# Patient Record
Sex: MALE | Race: WHITE | NOT HISPANIC OR LATINO | Employment: OTHER | URBAN - METROPOLITAN AREA
[De-identification: names, ages, dates, MRNs, and addresses within clinical notes are randomized per-mention and may not be internally consistent; named-entity substitution may affect disease eponyms.]

---

## 2020-04-05 ENCOUNTER — HOSPITAL ENCOUNTER (INPATIENT)
Facility: HOSPITAL | Age: 65
LOS: 4 days | Discharge: HOME HEALTH | DRG: 460 | End: 2020-04-09
Attending: INTERNAL MEDICINE | Admitting: FAMILY MEDICINE
Payer: COMMERCIAL
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
## 2020-04-05 DIAGNOSIS — S32.001A CLOSED BURST FRACTURE OF LUMBAR VERTEBRA, INITIAL ENCOUNTER: Primary | ICD-10-CM

## 2020-04-05 PROBLEM — N17.9 AKI (ACUTE KIDNEY INJURY): Status: ACTIVE | Noted: 2020-04-05

## 2020-04-05 PROBLEM — E78.5 HYPERLIPIDEMIA: Status: ACTIVE | Noted: 2020-04-05

## 2020-04-05 PROBLEM — I10 HYPERTENSION: Status: ACTIVE | Noted: 2020-04-05

## 2020-04-05 PROBLEM — K21.9 GERD (GASTROESOPHAGEAL REFLUX DISEASE): Status: ACTIVE | Noted: 2020-04-05

## 2020-04-05 LAB
ABO + RH BLD: NORMAL
ALBUMIN SERPL BCP-MCNC: 3.5 G/DL (ref 3.5–5.2)
ALP SERPL-CCNC: 75 U/L (ref 55–135)
ALT SERPL W/O P-5'-P-CCNC: 35 U/L (ref 10–44)
ANION GAP SERPL CALC-SCNC: 10 MMOL/L (ref 8–16)
APTT BLDCRRT: NORMAL SEC (ref 21–32)
AST SERPL-CCNC: 29 U/L (ref 10–40)
BASOPHILS # BLD AUTO: 0.01 K/UL (ref 0–0.2)
BASOPHILS NFR BLD: 0.1 % (ref 0–1.9)
BILIRUB SERPL-MCNC: 0.4 MG/DL (ref 0.1–1)
BLD GP AB SCN CELLS X3 SERPL QL: NORMAL
BUN SERPL-MCNC: 20 MG/DL (ref 8–23)
CALCIUM SERPL-MCNC: 8.6 MG/DL (ref 8.7–10.5)
CHLORIDE SERPL-SCNC: 113 MMOL/L (ref 95–110)
CO2 SERPL-SCNC: 22 MMOL/L (ref 23–29)
CREAT SERPL-MCNC: 1.6 MG/DL (ref 0.5–1.4)
DIFFERENTIAL METHOD: ABNORMAL
EOSINOPHIL # BLD AUTO: 0 K/UL (ref 0–0.5)
EOSINOPHIL NFR BLD: 0.4 % (ref 0–8)
ERYTHROCYTE [DISTWIDTH] IN BLOOD BY AUTOMATED COUNT: 13 % (ref 11.5–14.5)
EST. GFR  (AFRICAN AMERICAN): 52 ML/MIN/1.73 M^2
EST. GFR  (NON AFRICAN AMERICAN): 45 ML/MIN/1.73 M^2
GLUCOSE SERPL-MCNC: 107 MG/DL (ref 70–110)
HCT VFR BLD AUTO: 42 % (ref 40–54)
HGB BLD-MCNC: 13.5 G/DL (ref 14–18)
IMM GRANULOCYTES # BLD AUTO: 0.08 K/UL (ref 0–0.04)
IMM GRANULOCYTES NFR BLD AUTO: 0.8 % (ref 0–0.5)
INR PPP: 1 (ref 0.8–1.2)
LYMPHOCYTES # BLD AUTO: 1.1 K/UL (ref 1–4.8)
LYMPHOCYTES NFR BLD: 11.6 % (ref 18–48)
MCH RBC QN AUTO: 32.3 PG (ref 27–31)
MCHC RBC AUTO-ENTMCNC: 32.1 G/DL (ref 32–36)
MCV RBC AUTO: 101 FL (ref 82–98)
MONOCYTES # BLD AUTO: 0.6 K/UL (ref 0.3–1)
MONOCYTES NFR BLD: 6 % (ref 4–15)
NEUTROPHILS # BLD AUTO: 7.8 K/UL (ref 1.8–7.7)
NEUTROPHILS NFR BLD: 81.1 % (ref 38–73)
NRBC BLD-RTO: 0 /100 WBC
PLATELET # BLD AUTO: 186 K/UL (ref 150–350)
PMV BLD AUTO: 10.9 FL (ref 9.2–12.9)
POTASSIUM SERPL-SCNC: 4.2 MMOL/L (ref 3.5–5.1)
PROT SERPL-MCNC: 6.5 G/DL (ref 6–8.4)
PROTHROMBIN TIME: 11.1 SEC (ref 9–12.5)
RBC # BLD AUTO: 4.18 M/UL (ref 4.6–6.2)
SODIUM SERPL-SCNC: 145 MMOL/L (ref 136–145)
TROPONIN I SERPL DL<=0.01 NG/ML-MCNC: <0.006 NG/ML (ref 0–0.03)
WBC # BLD AUTO: 9.67 K/UL (ref 3.9–12.7)

## 2020-04-05 PROCEDURE — 85025 COMPLETE CBC W/AUTO DIFF WBC: CPT

## 2020-04-05 PROCEDURE — 84484 ASSAY OF TROPONIN QUANT: CPT

## 2020-04-05 PROCEDURE — 63600175 PHARM REV CODE 636 W HCPCS: Performed by: NEUROLOGICAL SURGERY

## 2020-04-05 PROCEDURE — 25000003 PHARM REV CODE 250: Performed by: INTERNAL MEDICINE

## 2020-04-05 PROCEDURE — 80053 COMPREHEN METABOLIC PANEL: CPT

## 2020-04-05 PROCEDURE — 93005 ELECTROCARDIOGRAM TRACING: CPT

## 2020-04-05 PROCEDURE — 93010 ELECTROCARDIOGRAM REPORT: CPT | Mod: ,,, | Performed by: INTERNAL MEDICINE

## 2020-04-05 PROCEDURE — 85730 THROMBOPLASTIN TIME PARTIAL: CPT

## 2020-04-05 PROCEDURE — 96374 THER/PROPH/DIAG INJ IV PUSH: CPT

## 2020-04-05 PROCEDURE — 93010 EKG 12-LEAD: ICD-10-PCS | Mod: ,,, | Performed by: INTERNAL MEDICINE

## 2020-04-05 PROCEDURE — 25000003 PHARM REV CODE 250: Performed by: NEUROLOGICAL SURGERY

## 2020-04-05 PROCEDURE — 63600175 PHARM REV CODE 636 W HCPCS: Performed by: INTERNAL MEDICINE

## 2020-04-05 PROCEDURE — 11000001 HC ACUTE MED/SURG PRIVATE ROOM

## 2020-04-05 PROCEDURE — 99291 CRITICAL CARE FIRST HOUR: CPT | Mod: 25

## 2020-04-05 PROCEDURE — 85610 PROTHROMBIN TIME: CPT

## 2020-04-05 PROCEDURE — 86901 BLOOD TYPING SEROLOGIC RH(D): CPT

## 2020-04-05 PROCEDURE — 25000003 PHARM REV CODE 250: Performed by: NURSE PRACTITIONER

## 2020-04-05 PROCEDURE — 36415 COLL VENOUS BLD VENIPUNCTURE: CPT

## 2020-04-05 PROCEDURE — 63600175 PHARM REV CODE 636 W HCPCS: Performed by: NURSE PRACTITIONER

## 2020-04-05 RX ORDER — PANTOPRAZOLE SODIUM 40 MG/1
40 TABLET, DELAYED RELEASE ORAL DAILY
COMMUNITY
Start: 2019-11-22

## 2020-04-05 RX ORDER — LISINOPRIL 20 MG/1
40 TABLET ORAL DAILY
Status: DISCONTINUED | OUTPATIENT
Start: 2020-04-06 | End: 2020-04-05

## 2020-04-05 RX ORDER — SODIUM CHLORIDE, SODIUM LACTATE, POTASSIUM CHLORIDE, CALCIUM CHLORIDE 600; 310; 30; 20 MG/100ML; MG/100ML; MG/100ML; MG/100ML
INJECTION, SOLUTION INTRAVENOUS CONTINUOUS
Status: DISCONTINUED | OUTPATIENT
Start: 2020-04-05 | End: 2020-04-05

## 2020-04-05 RX ORDER — METHOCARBAMOL 750 MG/1
750 TABLET, FILM COATED ORAL 3 TIMES DAILY
Status: DISCONTINUED | OUTPATIENT
Start: 2020-04-05 | End: 2020-04-09 | Stop reason: HOSPADM

## 2020-04-05 RX ORDER — AMLODIPINE BESYLATE 10 MG/1
10 TABLET ORAL DAILY
Status: DISCONTINUED | OUTPATIENT
Start: 2020-04-06 | End: 2020-04-09 | Stop reason: HOSPADM

## 2020-04-05 RX ORDER — ONDANSETRON 2 MG/ML
4 INJECTION INTRAMUSCULAR; INTRAVENOUS EVERY 8 HOURS PRN
Status: DISCONTINUED | OUTPATIENT
Start: 2020-04-05 | End: 2020-04-08

## 2020-04-05 RX ORDER — NEBIVOLOL 10 MG/1
10 TABLET ORAL DAILY
Status: DISCONTINUED | OUTPATIENT
Start: 2020-04-06 | End: 2020-04-09 | Stop reason: HOSPADM

## 2020-04-05 RX ORDER — LISINOPRIL 40 MG/1
40 TABLET ORAL DAILY
COMMUNITY
Start: 2019-11-22

## 2020-04-05 RX ORDER — HYDROMORPHONE HYDROCHLORIDE 1 MG/ML
1 INJECTION, SOLUTION INTRAMUSCULAR; INTRAVENOUS; SUBCUTANEOUS
Status: DISCONTINUED | OUTPATIENT
Start: 2020-04-05 | End: 2020-04-05

## 2020-04-05 RX ORDER — HYDROMORPHONE HYDROCHLORIDE 1 MG/ML
1 INJECTION, SOLUTION INTRAMUSCULAR; INTRAVENOUS; SUBCUTANEOUS
Status: DISCONTINUED | OUTPATIENT
Start: 2020-04-05 | End: 2020-04-06

## 2020-04-05 RX ORDER — OXYCODONE AND ACETAMINOPHEN 10; 325 MG/1; MG/1
1 TABLET ORAL EVERY 4 HOURS PRN
Status: DISCONTINUED | OUTPATIENT
Start: 2020-04-05 | End: 2020-04-07 | Stop reason: SDUPTHER

## 2020-04-05 RX ORDER — ATORVASTATIN CALCIUM 10 MG/1
20 TABLET, FILM COATED ORAL NIGHTLY
Status: DISCONTINUED | OUTPATIENT
Start: 2020-04-05 | End: 2020-04-09 | Stop reason: HOSPADM

## 2020-04-05 RX ORDER — SODIUM CHLORIDE 9 MG/ML
INJECTION, SOLUTION INTRAVENOUS CONTINUOUS
Status: DISCONTINUED | OUTPATIENT
Start: 2020-04-05 | End: 2020-04-09 | Stop reason: HOSPADM

## 2020-04-05 RX ORDER — HYDROMORPHONE HYDROCHLORIDE 2 MG/ML
1 INJECTION, SOLUTION INTRAMUSCULAR; INTRAVENOUS; SUBCUTANEOUS
Status: COMPLETED | OUTPATIENT
Start: 2020-04-05 | End: 2020-04-05

## 2020-04-05 RX ORDER — AMLODIPINE BESYLATE 10 MG/1
10 TABLET ORAL NIGHTLY
COMMUNITY

## 2020-04-05 RX ORDER — HEPARIN SODIUM 5000 [USP'U]/ML
5000 INJECTION, SOLUTION INTRAVENOUS; SUBCUTANEOUS ONCE
Status: DISCONTINUED | OUTPATIENT
Start: 2020-04-05 | End: 2020-04-05

## 2020-04-05 RX ORDER — DIPHENHYDRAMINE HYDROCHLORIDE 50 MG/ML
25 INJECTION INTRAMUSCULAR; INTRAVENOUS
Status: DISCONTINUED | OUTPATIENT
Start: 2020-04-05 | End: 2020-04-05

## 2020-04-05 RX ORDER — SODIUM CHLORIDE 9 MG/ML
INJECTION, SOLUTION INTRAVENOUS CONTINUOUS
Status: DISCONTINUED | OUTPATIENT
Start: 2020-04-05 | End: 2020-04-05

## 2020-04-05 RX ORDER — ATORVASTATIN CALCIUM 20 MG/1
20 TABLET, FILM COATED ORAL NIGHTLY
COMMUNITY
Start: 2019-11-22

## 2020-04-05 RX ORDER — GABAPENTIN 300 MG/1
300 CAPSULE ORAL 3 TIMES DAILY
Status: DISCONTINUED | OUTPATIENT
Start: 2020-04-05 | End: 2020-04-06

## 2020-04-05 RX ORDER — HEPARIN SODIUM 5000 [USP'U]/ML
5000 INJECTION, SOLUTION INTRAVENOUS; SUBCUTANEOUS EVERY 8 HOURS
Status: DISCONTINUED | OUTPATIENT
Start: 2020-04-05 | End: 2020-04-05

## 2020-04-05 RX ORDER — NEBIVOLOL 10 MG/1
10 TABLET ORAL DAILY
COMMUNITY
Start: 2019-11-22 | End: 2021-08-16

## 2020-04-05 RX ORDER — SODIUM CHLORIDE 0.9 % (FLUSH) 0.9 %
10 SYRINGE (ML) INJECTION
Status: DISCONTINUED | OUTPATIENT
Start: 2020-04-05 | End: 2020-04-09 | Stop reason: HOSPADM

## 2020-04-05 RX ORDER — PANTOPRAZOLE SODIUM 40 MG/1
40 TABLET, DELAYED RELEASE ORAL DAILY
Status: DISCONTINUED | OUTPATIENT
Start: 2020-04-06 | End: 2020-04-09 | Stop reason: HOSPADM

## 2020-04-05 RX ORDER — ONDANSETRON 8 MG/1
8 TABLET, ORALLY DISINTEGRATING ORAL
Status: COMPLETED | OUTPATIENT
Start: 2020-04-05 | End: 2020-04-05

## 2020-04-05 RX ADMIN — GABAPENTIN 300 MG: 300 CAPSULE ORAL at 08:04

## 2020-04-05 RX ADMIN — ONDANSETRON 8 MG: 8 TABLET, ORALLY DISINTEGRATING ORAL at 02:04

## 2020-04-05 RX ADMIN — HYDROMORPHONE HYDROCHLORIDE 1 MG: 2 INJECTION INTRAMUSCULAR; INTRAVENOUS; SUBCUTANEOUS at 04:04

## 2020-04-05 RX ADMIN — SODIUM CHLORIDE: 0.9 INJECTION, SOLUTION INTRAVENOUS at 06:04

## 2020-04-05 RX ADMIN — HYDROMORPHONE HYDROCHLORIDE 1 MG: 1 INJECTION, SOLUTION INTRAMUSCULAR; INTRAVENOUS; SUBCUTANEOUS at 09:04

## 2020-04-05 RX ADMIN — SODIUM CHLORIDE, SODIUM LACTATE, POTASSIUM CHLORIDE, AND CALCIUM CHLORIDE: .6; .31; .03; .02 INJECTION, SOLUTION INTRAVENOUS at 04:04

## 2020-04-05 RX ADMIN — HYDROMORPHONE HYDROCHLORIDE 1 MG: 2 INJECTION INTRAMUSCULAR; INTRAVENOUS; SUBCUTANEOUS at 02:04

## 2020-04-05 RX ADMIN — ATORVASTATIN CALCIUM 20 MG: 10 TABLET, FILM COATED ORAL at 08:04

## 2020-04-05 RX ADMIN — METHOCARBAMOL TABLETS 750 MG: 750 TABLET, COATED ORAL at 08:04

## 2020-04-05 RX ADMIN — HYDROMORPHONE HYDROCHLORIDE 1 MG: 1 INJECTION, SOLUTION INTRAMUSCULAR; INTRAVENOUS; SUBCUTANEOUS at 06:04

## 2020-04-05 NOTE — CONSULTS
.  Consult Note  Neurospine    Consult Requested By: Dr Rousseau     Reason for Consult:   compromise with thecal sac comprssion L1       SUBJECTIVE:     History of Present Illness:  Patient is a 64 y.o. male   Pt presents s/p fall from appx 12 ft   No LOC   States that he has pain 9/10 to back going into the hips and butt b/L. N/T to the groin area   Pain limited weakness on attempted ambulation   No urinary retention   Was able to ambulate with assistance     CT shows L1 burst fx with 3 column injury   MR shows appx 50-60 % canal comprimise    Scheduled Meds:   amLODIPine  10 mg Oral Daily    atorvastatin  20 mg Oral QHS    bupivacaine liposome (PF)  266 mg Infiltration Once    gabapentin  300 mg Oral TID    methocarbamoL  750 mg Oral TID    nebivoloL  10 mg Oral Daily    pantoprazole  40 mg Oral Daily     Continuous Infusions:   sodium chloride 0.9% 100 mL/hr at 04/06/20 0734     PRN Meds:HYDROmorphone, ondansetron, oxyCODONE-acetaminophen, sodium chloride 0.9%    Review of patient's allergies indicates:   Allergen Reactions    Ondansetron Other (See Comments)     Burning to oral mucosa after administration of zofran ODT 8mg tablet.       Past Medical History:   Diagnosis Date    GERD (gastroesophageal reflux disease)     Hyperlipidemia     Hypertension      History reviewed. No pertinent surgical history.  History reviewed. No pertinent family history.  Social History     Tobacco Use    Smoking status: Never Smoker   Substance Use Topics    Alcohol use: Not on file    Drug use: Never        Review of Systems:  Review of Systems   Constitutional: Negative for activity change, appetite change and chills.   HENT: Negative for congestion and ear pain.    Eyes: Negative for photophobia, redness and visual disturbance.   Respiratory: Negative for apnea and chest tightness.    Cardiovascular: Negative for chest pain.   Gastrointestinal: Negative for abdominal distention and abdominal pain.   Endocrine:  Negative for cold intolerance.   Genitourinary: Negative for difficulty urinating.   Musculoskeletal: Positive for myalgias, back pain/ Skin: Negative for color change.   Allergic/Immunologic: Negative for environmental allergies.   Neurological: Negative for dizziness.   Hematological: Negative for adenopathy. Does not bruise/bleed easily.   Psychiatric/Behavioral: Negative for agitation, behavioral problems and confusion.     OBJECTIVE:     Vital Signs (Most Recent)  Temp: 99.2 °F (37.3 °C) (04/06/20 0731)  Pulse: 82 (04/06/20 0731)  Resp: 18 (04/06/20 0731)  BP: (!) 145/87 (04/06/20 0731)  SpO2: 95 % (04/06/20 0731)    Vital Signs Range (Last 24H):  Temp:  [97.8 °F (36.6 °C)-99.2 °F (37.3 °C)]   Pulse:  [66-82]   Resp:  [16-18]   BP: (103-153)/(59-96)   SpO2:  [93 %-96 %]     Physical Exam:  Nursing note and vitals reviewed  Gen:Oriented to person, place, and time.             Appears stated age   Skin: no rashes or lesions identified   Head:Normocephalic and atraumatic.  Nose: Nose normal.    Eyes: EOM are normal. Pupils are equal, round, and reactive to light.   Neck: Neck supple. No masses or lesions palpated  Cardiovascular: Intact distal pulses.    Abdominal: Soft.   Neurological: Alert and oriented to person, place, and time.  No cranial nerve deficit.  Coordination normal. Normal muscle tone  Psychiatric: Normal mood and affect. Behavior is normal.      Back:        Tenderness bilaterally  Paraspinal muscle spasms    Limited  Pain with flexion and extention    limited Range of motion    Neg  Straight leg raise     Motor   Right Right Left Left  Level Group    4  4 L2 Hip flexor (Psoas)   5  5  L3 Leg extension (Quads)   5  5  L4 Dorsiflexion & foot inversion (Tibialis Anterior)   5  5  L5 Great toe extension ( EHL)   5  5  S1 Foot eversion (Gastroc, PL & PB)     Sensation  NL Decreased (R/L/BL) Level Sensation     diminished bilaterally  L2 Anterio-medial thigh   X  L3 Medial thigh around knee   X  L4  Medial foot   X  L5 Dorsum foot   X  S1 Lateral foot     Reflex  2+  Patellar tendon (L4)   2+  Achilles tendon (S1)           Laboratory:  CBC:   Recent Labs   Lab 04/06/20  0501   WBC 10.34  10.34   RBC 4.25*  4.25*   HGB 13.7*  13.7*   HCT 42.6  42.6     179   *  100*   MCH 32.2*  32.2*   MCHC 32.2  32.2     BMP:   Recent Labs   Lab 04/06/20  0501   *  113*     143   K 4.4  4.4   *  111*   CO2 23  23   BUN 24*  24*   CREATININE 1.1  1.1   CALCIUM 8.7  8.7       Diagnostic Results:  CT: Reviewed   Severely comminuted fracture involving the L1 vertebral body with associated decreased vertebral body height.  Large retropulsed fracture fragment causing moderate spinal stenosis.  There is also a vertical component extending through the lamina of L1 on the left.    MR   Shows fx at L1 with posterior retropulsion of fragment causing significant compression     ASSESSMENT/PLAN:     TO OR for T11- L 3 posterior stabilization and L1 decompression     Consents signed this AM     The patient was informed of all benefits and potential risk of the operation including but not limited to:  Pain, infection, bleeding, coma, paralysis, death.  Cerebrospinal fluid leak, failure of any instrumentation, the need for additional procedures in the future. No guarantee was given that this procedure would alleviate all of the symptoms.    Thank you for the referral   Please call with any questions    Donaldo Orlando MD  Neurosurgery     Disclaimer: This note was prepared using a voice recognition system and is likely to have sound alike errors within the text.

## 2020-04-05 NOTE — H&P
[facility] Hospital Medicine  History & Physical    Patient Name: Eliud Frankel  MRN: [id number]  Admission Date: 4/5/2020  Attending Physician: Xiao Alvarez MD   Primary Care Provider: No primary care provider on file.         Patient information was obtained from patient and ER records.     Subjective:     Principal Problem:Fall (on) (from) unspecified stairs and steps, initial encounter    Chief Complaint:   Chief Complaint   Patient presents with    Fall     pt brought in by [facility] for fall today off ladder, pt denies LOC or taking blood thinners, pt c/o Rt lower side and back pain        HPI: Eliud Frankel is a 64 y.o. male patient with a PMHx of GERD, HLD, and HTN who presents to the Emergency Department for evaluation of fall from ladder of approximately 8 ft which onset suddenly just PTA. Symptoms are constant and moderate in severity. No mitigating or exacerbating factors reported. Associated sxs include right lower side/back pain. Patient denies any LOC, head injury/trauma, neck pain, CP, SOB, abd pain, fever, chills, numbness, weakness, n/v/d, and all other sxs at this time. Prior tx includes pain meds en route. In the ED, creatinine 1.6 otherwise labs unremarkable. CT spine revealed severely comminuted fracture involving the L1 vertebral body with associated decreased vertebral body height.  Large retropulsed fracture fragment causing moderate spinal stenosis.  There is also a vertical component extending through the lamina of L1 on the left. Extensive right nephrolithiasis. 8 mm stone involving the left renal pelvis. No definite hydronephrosis. Dr. Orlando was consulted in the ED, plans for surgery tomorrow. Hospital medicine called for admission.     Past Medical History:   Diagnosis Date    GERD (gastroesophageal reflux disease)     Hyperlipidemia     Hypertension        History reviewed. No pertinent surgical history.    Review of patient's allergies indicates:   Allergen  Reactions    Ondansetron Other (See Comments)     Burning to oral mucosa after administration of zofran ODT 8mg tablet.       No current facility-administered medications on file prior to encounter.      Current Outpatient Medications on File Prior to Encounter   Medication Sig    amLODIPine (NORVASC) 10 MG tablet Take 10 mg by mouth.    atorvastatin (LIPITOR) 20 MG tablet Take 20 mg by mouth.    lisinopriL (PRINIVIL,ZESTRIL) 40 MG tablet Take 40 mg by mouth.    nebivoloL (BYSTOLIC) 10 MG Tab Take 10 mg by mouth.    pantoprazole (PROTONIX) 40 MG tablet Take 40 mg by mouth.     Family History     None        Tobacco Use    Smoking status: Never Smoker   Substance and Sexual Activity    Alcohol use: Not on file    Drug use: Never    Sexual activity: Not on file     Review of Systems   Constitutional: Negative.  Negative for activity change, appetite change, chills, diaphoresis, fatigue and fever.   HENT: Negative.    Eyes: Negative.    Respiratory: Negative.  Negative for cough and shortness of breath.    Cardiovascular: Negative.  Negative for chest pain, palpitations and leg swelling.   Gastrointestinal: Negative.    Endocrine: Negative.    Genitourinary: Negative.  Negative for dysuria, flank pain and urgency.   Musculoskeletal: Positive for back pain.   Skin: Negative.    Allergic/Immunologic: Negative.    Neurological: Negative.  Negative for dizziness, syncope, weakness, numbness and headaches.   Hematological: Negative.    Psychiatric/Behavioral: Negative.      Objective:     Vital Signs (Most Recent):  Temp: 98.7 °F (37.1 °C) (04/05/20 1739)  Pulse: 74 (04/05/20 1739)  Resp: 16 (04/05/20 1739)  BP: (!) 153/69 (04/05/20 1739)  SpO2: 95 % (04/05/20 1739) Vital Signs (24h Range):  Temp:  [98.2 °F (36.8 °C)-98.7 °F (37.1 °C)] 98.7 °F (37.1 °C)  Pulse:  [66-77] 74  Resp:  [16] 16  SpO2:  [93 %-96 %] 95 %  BP: (113-153)/(67-96) 153/69     Weight: 123 kg (271 lb 1.6 oz)  Body mass index is 36.77  kg/m².    Physical Exam   Constitutional: He is oriented to person, place, and time. He appears well-developed and well-nourished.   HENT:   Head: Normocephalic and atraumatic.   Eyes: Pupils are equal, round, and reactive to light. EOM are normal.   Neck: Normal range of motion. Neck supple.   Cardiovascular: Normal rate, regular rhythm, normal heart sounds, intact distal pulses and normal pulses.   Pulmonary/Chest: Effort normal and breath sounds normal. No accessory muscle usage. No tachypnea. No respiratory distress.   Abdominal: Soft. Normal appearance and bowel sounds are normal. There is no tenderness.   Musculoskeletal:        Lumbar back: He exhibits decreased range of motion (secondary to pain ), tenderness and pain.   Neurological: He is alert and oriented to person, place, and time. He has normal reflexes.   Skin: Skin is warm, dry and intact. Capillary refill takes less than 2 seconds.   Psychiatric: He has a normal mood and affect. His speech is normal and behavior is normal. Judgment and thought content normal. Cognition and memory are normal.   Nursing note and vitals reviewed.       Significant Labs:   BMP:   Recent Labs   Lab 04/05/20  1428         K 4.2   *   CO2 22*   BUN 20   CREATININE 1.6*   CALCIUM 8.6*     CBC:   Recent Labs   Lab 04/05/20  1428   WBC 9.67   HGB 13.5*   HCT 42.0        CMP:   Recent Labs   Lab 04/05/20  1428      K 4.2   *   CO2 22*      BUN 20   CREATININE 1.6*   CALCIUM 8.6*   PROT 6.5   ALBUMIN 3.5   BILITOT 0.4   ALKPHOS 75   AST 29   ALT 35   ANIONGAP 10   EGFRNONAA 45*     Coagulation:   Recent Labs   Lab 04/05/20  1428   INR 1.0   APTT SEE COMMENT     Troponin:   Recent Labs   Lab 04/05/20  1428   TROPONINI <0.006     All pertinent labs within the past 24 hours have been reviewed.    Significant Imaging:  Imaging Results          CT Lumbar Spine Without Contrast (Final result)  Result time 04/05/20 15:34:45    Final result  by Rizwan Albarran MD (Timothy) (04/05/20 15:34:45)                 Impression:      Severely comminuted fracture involving the L1 vertebral body with associated decreased vertebral body height.  Large retropulsed fracture fragment causing moderate spinal stenosis.  There is also a vertical component extending through the lamina of L1 on the left.    Extensive right nephrolithiasis.  8 mm stone involving the left renal pelvis.  No definite hydronephrosis.    All CT scans at this facility are performed  using dose modulation techniques as appropriate to performed exam including the following:  automated exposure control; adjustment of mA and/or kV according to the patients size (this includes techniques or standardized protocols for targeted exams where dose is matched to indication/reason for exam: i.e. extremities or head);  iterative reconstruction technique.      Electronically signed by: Rizwan Albarran MD  Date:    04/05/2020  Time:    15:34             Narrative:    EXAMINATION:  CT LUMBAR SPINE WITHOUT CONTRAST    CLINICAL HISTORY:  Spine fracture, traumatic, lumbar;Low back pain, risk factors (osteoporosis or chronic steroid use or elderly);    TECHNIQUE:  Standard noncontrast CT scan of the lumbar spine.    COMPARISON:  None    FINDINGS:  There is a comminuted fracture involving the L1 vertebral body associated with decreased vertebral body height.  There is a large retropulsed fracture fragment noted measuring 1.7 by 1.3 cm causing moderate spinal stenosis.  There is also a component of the fracture extending through the lamina on L1 left.    No other fractures identified.  No disc herniations.  There is evidence of facet arthropathy bilaterally at L4-5 and L5-S1.    There is evidence of nephrolithiasis involving the right kidney.  There is a 8 mm stone in the left renal pelvis.  No definite hydronephrosis.                              Assessment/Plan:     * Fall (on) (from) unspecified stairs and steps,  initial encounter  CT scan revealed L1 burst fracture with retropulsion of fragment posteriorly   Neurosurgery consulted   Plan for surgery tomorrow  NPO after midnight   Neurovascular checks   Analgesics prn         GUSTABO (acute kidney injury)  Creatinine 1.6 (baseline 1.3)  IVFs   Daily BMP       GERD (gastroesophageal reflux disease)  Continue Protonix       Hyperlipidemia  Continue statin       Hypertension  Continue amlodipine and BB  ACEi held due to GUSTABO   BP stable       VTE Risk Mitigation (From admission, onward)         Ordered     heparin (porcine) injection 5,000 Units  Once      04/05/20 1821     IP VTE HIGH RISK PATIENT  Once      04/05/20 1612                   Lore Ozuna NP  Department of Hospital Medicine   [facility]

## 2020-04-05 NOTE — SUBJECTIVE & OBJECTIVE
Past Medical History:   Diagnosis Date    GERD (gastroesophageal reflux disease)     Hyperlipidemia     Hypertension        History reviewed. No pertinent surgical history.    Review of patient's allergies indicates:   Allergen Reactions    Ondansetron Other (See Comments)     Burning to oral mucosa after administration of zofran ODT 8mg tablet.       No current facility-administered medications on file prior to encounter.      Current Outpatient Medications on File Prior to Encounter   Medication Sig    amLODIPine (NORVASC) 10 MG tablet Take 10 mg by mouth.    atorvastatin (LIPITOR) 20 MG tablet Take 20 mg by mouth.    lisinopriL (PRINIVIL,ZESTRIL) 40 MG tablet Take 40 mg by mouth.    nebivoloL (BYSTOLIC) 10 MG Tab Take 10 mg by mouth.    pantoprazole (PROTONIX) 40 MG tablet Take 40 mg by mouth.     Family History     None        Tobacco Use    Smoking status: Never Smoker   Substance and Sexual Activity    Alcohol use: Not on file    Drug use: Never    Sexual activity: Not on file     Review of Systems   Constitutional: Negative.  Negative for activity change, appetite change, chills, diaphoresis, fatigue and fever.   HENT: Negative.    Eyes: Negative.    Respiratory: Negative.  Negative for cough and shortness of breath.    Cardiovascular: Negative.  Negative for chest pain, palpitations and leg swelling.   Gastrointestinal: Negative.    Endocrine: Negative.    Genitourinary: Negative.  Negative for dysuria, flank pain and urgency.   Musculoskeletal: Positive for back pain.   Skin: Negative.    Allergic/Immunologic: Negative.    Neurological: Negative.  Negative for dizziness, syncope, weakness, numbness and headaches.   Hematological: Negative.    Psychiatric/Behavioral: Negative.      Objective:     Vital Signs (Most Recent):  Temp: 98.7 °F (37.1 °C) (04/05/20 1739)  Pulse: 74 (04/05/20 1739)  Resp: 16 (04/05/20 1739)  BP: (!) 153/69 (04/05/20 1739)  SpO2: 95 % (04/05/20 1739) Vital Signs (24h  Range):  Temp:  [98.2 °F (36.8 °C)-98.7 °F (37.1 °C)] 98.7 °F (37.1 °C)  Pulse:  [66-77] 74  Resp:  [16] 16  SpO2:  [93 %-96 %] 95 %  BP: (113-153)/(67-96) 153/69     Weight: 123 kg (271 lb 1.6 oz)  Body mass index is 36.77 kg/m².    Physical Exam   Constitutional: He is oriented to person, place, and time. He appears well-developed and well-nourished.   HENT:   Head: Normocephalic and atraumatic.   Eyes: Pupils are equal, round, and reactive to light. EOM are normal.   Neck: Normal range of motion. Neck supple.   Cardiovascular: Normal rate, regular rhythm, normal heart sounds, intact distal pulses and normal pulses.   Pulmonary/Chest: Effort normal and breath sounds normal. No accessory muscle usage. No tachypnea. No respiratory distress.   Abdominal: Soft. Normal appearance and bowel sounds are normal. There is no tenderness.   Musculoskeletal:        Lumbar back: He exhibits decreased range of motion (secondary to pain ), tenderness and pain.   Neurological: He is alert and oriented to person, place, and time. He has normal reflexes.   Skin: Skin is warm, dry and intact. Capillary refill takes less than 2 seconds.   Psychiatric: He has a normal mood and affect. His speech is normal and behavior is normal. Judgment and thought content normal. Cognition and memory are normal.   Nursing note and vitals reviewed.       Significant Labs:   BMP:   Recent Labs   Lab 04/05/20  1428         K 4.2   *   CO2 22*   BUN 20   CREATININE 1.6*   CALCIUM 8.6*     CBC:   Recent Labs   Lab 04/05/20  1428   WBC 9.67   HGB 13.5*   HCT 42.0        CMP:   Recent Labs   Lab 04/05/20  1428      K 4.2   *   CO2 22*      BUN 20   CREATININE 1.6*   CALCIUM 8.6*   PROT 6.5   ALBUMIN 3.5   BILITOT 0.4   ALKPHOS 75   AST 29   ALT 35   ANIONGAP 10   EGFRNONAA 45*     Coagulation:   Recent Labs   Lab 04/05/20  1428   INR 1.0   APTT SEE COMMENT     Troponin:   Recent Labs   Lab 04/05/20  1428    TROPONINI <0.006     All pertinent labs within the past 24 hours have been reviewed.    Significant Imaging:  Imaging Results          CT Lumbar Spine Without Contrast (Final result)  Result time 04/05/20 15:34:45    Final result by Rizwan Albarran MD (Timothy) (04/05/20 15:34:45)                 Impression:      Severely comminuted fracture involving the L1 vertebral body with associated decreased vertebral body height.  Large retropulsed fracture fragment causing moderate spinal stenosis.  There is also a vertical component extending through the lamina of L1 on the left.    Extensive right nephrolithiasis.  8 mm stone involving the left renal pelvis.  No definite hydronephrosis.    All CT scans at this facility are performed  using dose modulation techniques as appropriate to performed exam including the following:  automated exposure control; adjustment of mA and/or kV according to the patients size (this includes techniques or standardized protocols for targeted exams where dose is matched to indication/reason for exam: i.e. extremities or head);  iterative reconstruction technique.      Electronically signed by: Rizwan Albarran MD  Date:    04/05/2020  Time:    15:34             Narrative:    EXAMINATION:  CT LUMBAR SPINE WITHOUT CONTRAST    CLINICAL HISTORY:  Spine fracture, traumatic, lumbar;Low back pain, risk factors (osteoporosis or chronic steroid use or elderly);    TECHNIQUE:  Standard noncontrast CT scan of the lumbar spine.    COMPARISON:  None    FINDINGS:  There is a comminuted fracture involving the L1 vertebral body associated with decreased vertebral body height.  There is a large retropulsed fracture fragment noted measuring 1.7 by 1.3 cm causing moderate spinal stenosis.  There is also a component of the fracture extending through the lamina on L1 left.    No other fractures identified.  No disc herniations.  There is evidence of facet arthropathy bilaterally at L4-5 and L5-S1.    There is  evidence of nephrolithiasis involving the right kidney.  There is a 8 mm stone in the left renal pelvis.  No definite hydronephrosis.

## 2020-04-05 NOTE — ED NOTES
arnold Vizcaino, went to collect urine sample from pt. Nurse asked to wait until pt become a little more stable. Tech will continue to monitor and collect sample when provided.

## 2020-04-05 NOTE — ED NOTES
Upon administration of zofran ODT pt immediately c/o burning to mouth.  Dr. Gutierrez called to bedside.  Pt evaluated.  Verbal order received.

## 2020-04-05 NOTE — ED PROVIDER NOTES
SCRIBE #1 NOTE: I, Lilibeth Borjas, am scribing for, and in the presence of, Luz Gutierrez MD. I have scribed the entire note.       History     Chief Complaint   Patient presents with    Fall     pt brought in by [facility] for fall today off ladder, pt denies LOC or taking blood thinners, pt c/o Rt lower side and back pain     Review of patient's allergies indicates:   Allergen Reactions    Ondansetron Other (See Comments)     Burning to oral mucosa after administration of zofran ODT 8mg tablet.         History of Present Illness     HPI    4/5/2020, 2:07 PM  History obtained from the patient      History of Present Illness: Eliud Frankel is a 64 y.o. male patient with a PMHx of GERD, HLD, and HTN who presents to the Emergency Department for evaluation of fall from ladder of approximately 8 ft which onset suddenly just PTA. Symptoms are constant and moderate in severity. No mitigating or exacerbating factors reported. Associated sxs include right lower side/back pain. Patient denies any LOC, head injury/trauma, neck pain, CP, SOB, abd pain, fever, chills, numbness, weakness, n/v/d, and all other sxs at this time. Prior tx includes pain meds en route No further complaints or concerns at this time.     After the fall patient got up by himself and walk at least 30 ft to come back inside the house.      Arrival mode: [facility]    PCP: No primary care provider on file.        Past Medical History:  Past Medical History:   Diagnosis Date    GERD (gastroesophageal reflux disease)     Hyperlipidemia     Hypertension        Past Surgical History:  Past surgical history reviewed. No pertinent past surgical history.    Family History:  History reviewed. No pertinent family history.    Social History:  Social History     Tobacco Use    Smoking status: Unknown   Substance and Sexual Activity    Alcohol use: Unknown    Drug use: Unknown    Sexual activity: Unknown        Review of Systems     Review of Systems    Constitutional: Negative for chills and fever.        (+) fall   HENT: Negative for sore throat.         (-) head injury/trauma   Respiratory: Negative for shortness of breath.    Cardiovascular: Negative for chest pain.   Gastrointestinal: Negative for abdominal pain, diarrhea, nausea and vomiting.   Genitourinary: Negative for dysuria.   Musculoskeletal: Positive for back pain (R lower back/side ). Negative for neck pain.   Skin: Negative for rash.   Neurological: Negative for weakness and numbness.        (-) LOC  (-) bladder/bowel incontinence   Hematological: Does not bruise/bleed easily.   All other systems reviewed and are negative.     Physical Exam     Initial Vitals [04/05/20 1403]   BP Pulse Resp Temp SpO2   134/67 72 16 98.2 °F (36.8 °C) (!) 94 %      MAP       --          Physical Exam  Nursing Notes and Vital Signs Reviewed.  Constitutional: Patient is in no acute distress. Well-developed and well-nourished.  Head: Atraumatic. Normocephalic.  Eyes: PERRL. EOM intact. Conjunctivae are not pale. No scleral icterus.  ENT: Mucous membranes are moist. Oropharynx is clear and symmetric.    Cardiovascular: Regular rate. Regular rhythm. No murmurs, rubs, or gallops. Distal pulses are 2+ and symmetric.  Pulmonary/Chest: No respiratory distress. Clear to auscultation bilaterally. No wheezing or rales.  Abdominal: Soft and non-distended.  There is no tenderness.  No rebound, guarding, or rigidity. Good bowel sounds.  Genitourinary: No CVA tenderness  Musculoskeletal: Moves all extremities. No obvious deformities. No edema. No calf tenderness.  Skin: Warm and dry.  Neck: Supple. No cervical midline bony tenderness, deformities, or step-offs.   Back: Extremely painful. Limited ROM seoncary to pain. No midline bony tenderness, deformities, or step-offs of the T-spine or L-spine. Skin appears normal without abrasions or bruising. No erythema, induration, or fluctuance.     Extreme pain and limited movement in the  lumbar spine.  Patient did not move and was laid on his back to avoid movement with a high suspicion of lumbosacral fracture  Neurological: Awake and alert. Appropriate for age. No strength deficit; equal and 5/5 in bilateral upper and lower extremities. No light touch sensory deficit. Normal gait. No acute focal neurological deficits noted.  Psychiatric: Normal affect. Good eye contact. Appropriate in content.     ED Course   Critical Care  Date/Time: 4/5/2020 4:38 PM  Performed by: Luz Gutierrez MD  Authorized by: Luz Gutierrez MD   Direct patient critical care time: 15 minutes  Additional history critical care time: 10 minutes  Ordering / reviewing critical care time: 15 minutes  Documentation critical care time: 10 minutes  Total critical care time (exclusive of procedural time) : 50 minutes  Critical care time was exclusive of separately billable procedures and treating other patients and teaching time.  Critical care was necessary to treat or prevent imminent or life-threatening deterioration of the following conditions: trauma (Spinal fracture).  Critical care was time spent personally by me on the following activities: blood draw for specimens, development of treatment plan with patient or surrogate, discussions with consultants, interpretation of cardiac output measurements, evaluation of patient's response to treatment, examination of patient, obtaining history from patient or surrogate, ordering and performing treatments and interventions, ordering and review of laboratory studies, ordering and review of radiographic studies, re-evaluation of patient's condition, pulse oximetry and review of old charts.        ED Vital Signs:  Vitals:    04/05/20 1403 04/05/20 1421 04/05/20 1454 04/05/20 1510   BP: 134/67  (!) 144/96 120/78   Pulse: 72  66 66   Resp: 16      Temp: 98.2 °F (36.8 °C)      TempSrc: Oral      SpO2: (!) 94%  95% 96%   Weight:  123 kg (271 lb 1.6 oz)     Height: 6' (1.829 m)        04/05/20 1515 04/05/20 1530 04/05/20 1545 04/05/20 1600   BP: 113/75 114/81 115/72 119/78   Pulse: 74 67 66 68   Resp:       Temp:       TempSrc:       SpO2: (!) 93% 95% (!) 94% 95%   Weight:       Height:        04/05/20 1700 04/05/20 1715 04/05/20 1739   BP: 128/86 115/75 (!) 153/69   Pulse: 76 77 74   Resp:   16   Temp:   98.7 °F (37.1 °C)   TempSrc:   Oral   SpO2: (!) 94% (!) 93% 95%   Weight:      Height:          Abnormal Lab Results:  Labs Reviewed   CBC W/ AUTO DIFFERENTIAL - Abnormal; Notable for the following components:       Result Value    RBC 4.18 (*)     Hemoglobin 13.5 (*)     Mean Corpuscular Volume 101 (*)     Mean Corpuscular Hemoglobin 32.3 (*)     Immature Granulocytes 0.8 (*)     Gran # (ANC) 7.8 (*)     Immature Grans (Abs) 0.08 (*)     Gran% 81.1 (*)     Lymph% 11.6 (*)     All other components within normal limits   COMPREHENSIVE METABOLIC PANEL - Abnormal; Notable for the following components:    Chloride 113 (*)     CO2 22 (*)     Creatinine 1.6 (*)     Calcium 8.6 (*)     eGFR if  52 (*)     eGFR if non  45 (*)     All other components within normal limits   PROTIME-INR   TROPONIN I   APTT   URINALYSIS, REFLEX TO URINE CULTURE        All Lab Results:  Results for orders placed or performed during the hospital encounter of 04/05/20   CBC auto differential   Result Value Ref Range    WBC 9.67 3.90 - 12.70 K/uL    RBC 4.18 (L) 4.60 - 6.20 M/uL    Hemoglobin 13.5 (L) 14.0 - 18.0 g/dL    Hematocrit 42.0 40.0 - 54.0 %    Mean Corpuscular Volume 101 (H) 82 - 98 fL    Mean Corpuscular Hemoglobin 32.3 (H) 27.0 - 31.0 pg    Mean Corpuscular Hemoglobin Conc 32.1 32.0 - 36.0 g/dL    RDW 13.0 11.5 - 14.5 %    Platelets 186 150 - 350 K/uL    MPV 10.9 9.2 - 12.9 fL    Immature Granulocytes 0.8 (H) 0.0 - 0.5 %    Gran # (ANC) 7.8 (H) 1.8 - 7.7 K/uL    Immature Grans (Abs) 0.08 (H) 0.00 - 0.04 K/uL    Lymph # 1.1 1.0 - 4.8 K/uL    Mono # 0.6 0.3 - 1.0 K/uL    Eos # 0.0 0.0  - 0.5 K/uL    Baso # 0.01 0.00 - 0.20 K/uL    nRBC 0 0 /100 WBC    Gran% 81.1 (H) 38.0 - 73.0 %    Lymph% 11.6 (L) 18.0 - 48.0 %    Mono% 6.0 4.0 - 15.0 %    Eosinophil% 0.4 0.0 - 8.0 %    Basophil% 0.1 0.0 - 1.9 %    Differential Method Automated    Comprehensive metabolic panel   Result Value Ref Range    Sodium 145 136 - 145 mmol/L    Potassium 4.2 3.5 - 5.1 mmol/L    Chloride 113 (H) 95 - 110 mmol/L    CO2 22 (L) 23 - 29 mmol/L    Glucose 107 70 - 110 mg/dL    BUN, Bld 20 8 - 23 mg/dL    Creatinine 1.6 (H) 0.5 - 1.4 mg/dL    Calcium 8.6 (L) 8.7 - 10.5 mg/dL    Total Protein 6.5 6.0 - 8.4 g/dL    Albumin 3.5 3.5 - 5.2 g/dL    Total Bilirubin 0.4 0.1 - 1.0 mg/dL    Alkaline Phosphatase 75 55 - 135 U/L    AST 29 10 - 40 U/L    ALT 35 10 - 44 U/L    Anion Gap 10 8 - 16 mmol/L    eGFR if African American 52 (A) >60 mL/min/1.73 m^2    eGFR if non African American 45 (A) >60 mL/min/1.73 m^2   Protime-INR   Result Value Ref Range    Prothrombin Time 11.1 9.0 - 12.5 sec    INR 1.0 0.8 - 1.2   Troponin I   Result Value Ref Range    Troponin I <0.006 0.000 - 0.026 ng/mL   APTT   Result Value Ref Range    aPTT SEE COMMENT 21.0 - 32.0 sec   Type And Screen Preop   Result Value Ref Range    Group & Rh A POS     Indirect Eloisa NEG        Imaging Results:  Imaging Results          CT Lumbar Spine Without Contrast (Final result)  Result time 04/05/20 15:34:45    Final result by Rizwan Albarran MD (Timothy) (04/05/20 15:34:45)                 Impression:      Severely comminuted fracture involving the L1 vertebral body with associated decreased vertebral body height.  Large retropulsed fracture fragment causing moderate spinal stenosis.  There is also a vertical component extending through the lamina of L1 on the left.    Extensive right nephrolithiasis.  8 mm stone involving the left renal pelvis.  No definite hydronephrosis.    All CT scans at this facility are performed  using dose modulation techniques as appropriate to  performed exam including the following:  automated exposure control; adjustment of mA and/or kV according to the patients size (this includes techniques or standardized protocols for targeted exams where dose is matched to indication/reason for exam: i.e. extremities or head);  iterative reconstruction technique.      Electronically signed by: Rizwan Albarran MD  Date:    04/05/2020  Time:    15:34             Narrative:    EXAMINATION:  CT LUMBAR SPINE WITHOUT CONTRAST    CLINICAL HISTORY:  Spine fracture, traumatic, lumbar;Low back pain, risk factors (osteoporosis or chronic steroid use or elderly);    TECHNIQUE:  Standard noncontrast CT scan of the lumbar spine.    COMPARISON:  None    FINDINGS:  There is a comminuted fracture involving the L1 vertebral body associated with decreased vertebral body height.  There is a large retropulsed fracture fragment noted measuring 1.7 by 1.3 cm causing moderate spinal stenosis.  There is also a component of the fracture extending through the lamina on L1 left.    No other fractures identified.  No disc herniations.  There is evidence of facet arthropathy bilaterally at L4-5 and L5-S1.    There is evidence of nephrolithiasis involving the right kidney.  There is a 8 mm stone in the left renal pelvis.  No definite hydronephrosis.                                 The EKG was ordered, reviewed, and independently interpreted by the ED provider.  Interpretation time: 1438  Rate: 61 BPM  Rhythm: normal sinus rhythm  Interpretation: LAD. Minimal voltage criteria for LVH, may be normal variant. Nonspecific ST abnormality. No STEMI.           The Emergency Provider reviewed the vital signs and test results, which are outlined above.     ED Discussion     4:13PM: Discussed pt's case with Dr. Orlando (Neurosurgery) who recommends admitting patient to Hospital Medicine.     4:14 PM: Re-evaluated pt. I have discussed test results, shared treatment plan, and the need for admission with  patient and family at bedside. Pt and family express understanding at this time and agree with all information. All questions answered. Pt and family have no further questions or concerns at this time. Pt is ready for admit.    4:14 PM: Discussed case with Jose Castellanos NP (Hospital Medicine). Dr. Alvarez agrees with current care and management of pt and accepts admission.   Admitting Service: Hospital Medicine  Admitting Physician: Dr. Alvarez  Admit to: [facility]       Medical Decision Making:   Clinical Tests:   Lab Tests: Reviewed and Ordered  Radiological Study: Reviewed and Ordered  Medical Tests: Ordered and Reviewed           ED Medication(s):  Medications   sodium chloride 0.9% flush 10 mL (has no administration in time range)   heparin (porcine) injection 5,000 Units (has no administration in time range)   amLODIPine tablet 10 mg (has no administration in time range)   atorvastatin tablet 20 mg (has no administration in time range)   nebivoloL tablet 10 mg (has no administration in time range)   pantoprazole EC tablet 40 mg (has no administration in time range)   HYDROmorphone injection Syrg 1 mg (has no administration in time range)   ondansetron injection 4 mg (has no administration in time range)   oxyCODONE-acetaminophen  mg per tablet 1 tablet (has no administration in time range)   0.9%  NaCl infusion (has no administration in time range)   hydromorphone (PF) injection 1 mg (1 mg Intravenous Given 4/5/20 1447)   ondansetron disintegrating tablet 8 mg (8 mg Oral Given 4/5/20 1447)   hydromorphone (PF) injection 1 mg (1 mg Intravenous Given 4/5/20 1632)       Current Discharge Medication List                  Scribe Attestation:   Scribe #1: I performed the above scribed service and the documentation accurately describes the services I performed. I attest to the accuracy of the note.     Attending:   Physician Attestation Statement for Scribe #1: I, Luz Gutierrez MD, personally performed  the services described in this documentation, as scribed by Lilibeth Borjas, in my presence, and it is both accurate and complete.           Clinical Impression       ICD-10-CM ICD-9-CM   1. Closed burst fracture of lumbar vertebra, initial encounter S32.001A 805.4   2. Fall (on) (from) unspecified stairs and steps, initial encounter W10.9XXA E880.9       Disposition:   Disposition: Admitted  Condition: Stable       Luz Gutierrez MD  04/05/20 6110

## 2020-04-05 NOTE — HPI
Eliud Frankel is a 64 y.o. male patient with a PMHx of GERD, HLD, and HTN who presents to the Emergency Department for evaluation of fall from ladder of approximately 8 ft which onset suddenly just PTA. Symptoms are constant and moderate in severity. No mitigating or exacerbating factors reported. Associated sxs include right lower side/back pain. Patient denies any LOC, head injury/trauma, neck pain, CP, SOB, abd pain, fever, chills, numbness, weakness, n/v/d, and all other sxs at this time. Prior tx includes pain meds en route. In the ED, creatinine 1.6 otherwise labs unremarkable. CT spine revealed severely comminuted fracture involving the L1 vertebral body with associated decreased vertebral body height.  Large retropulsed fracture fragment causing moderate spinal stenosis.  There is also a vertical component extending through the lamina of L1 on the left. Extensive right nephrolithiasis. 8 mm stone involving the left renal pelvis. No definite hydronephrosis. Dr. Orlando was consulted in the ED, plans for surgery tomorrow. Hospital medicine called for admission.

## 2020-04-05 NOTE — ED NOTES
Decreased redness and pain to oral mucosa, Dr. Gutierrez notified. Verbal order received to brian/c benadryl.

## 2020-04-05 NOTE — PLAN OF CARE
Patient free from injury.  Pain controlled with prn pain medication.  NAD.  VSS.  Instructed to call staff for assistance with ADL's.  NPO at midnight.  POC reviewed with patient. Verbalized understanding.  Chart check completed.  Will monitor.

## 2020-04-06 ENCOUNTER — ANESTHESIA EVENT (OUTPATIENT)
Dept: SURGERY | Facility: HOSPITAL | Age: 65
DRG: 460 | End: 2020-04-06
Payer: COMMERCIAL

## 2020-04-06 ENCOUNTER — ANESTHESIA (OUTPATIENT)
Dept: SURGERY | Facility: HOSPITAL | Age: 65
DRG: 460 | End: 2020-04-06
Payer: COMMERCIAL
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
## 2020-04-06 PROBLEM — N17.9 AKI (ACUTE KIDNEY INJURY): Chronic | Status: ACTIVE | Noted: 2020-04-05

## 2020-04-06 LAB
ANION GAP SERPL CALC-SCNC: 9 MMOL/L (ref 8–16)
ANION GAP SERPL CALC-SCNC: 9 MMOL/L (ref 8–16)
BACTERIA #/AREA URNS HPF: ABNORMAL /HPF
BASOPHILS # BLD AUTO: 0.01 K/UL (ref 0–0.2)
BASOPHILS # BLD AUTO: 0.01 K/UL (ref 0–0.2)
BASOPHILS NFR BLD: 0.1 % (ref 0–1.9)
BASOPHILS NFR BLD: 0.1 % (ref 0–1.9)
BILIRUB UR QL STRIP: NEGATIVE
BUN SERPL-MCNC: 24 MG/DL (ref 8–23)
BUN SERPL-MCNC: 24 MG/DL (ref 8–23)
CALCIUM SERPL-MCNC: 8.7 MG/DL (ref 8.7–10.5)
CALCIUM SERPL-MCNC: 8.7 MG/DL (ref 8.7–10.5)
CHLORIDE SERPL-SCNC: 111 MMOL/L (ref 95–110)
CHLORIDE SERPL-SCNC: 111 MMOL/L (ref 95–110)
CLARITY UR: CLEAR
CO2 SERPL-SCNC: 23 MMOL/L (ref 23–29)
CO2 SERPL-SCNC: 23 MMOL/L (ref 23–29)
COLOR UR: YELLOW
CREAT SERPL-MCNC: 1.1 MG/DL (ref 0.5–1.4)
CREAT SERPL-MCNC: 1.1 MG/DL (ref 0.5–1.4)
DIFFERENTIAL METHOD: ABNORMAL
DIFFERENTIAL METHOD: ABNORMAL
EOSINOPHIL # BLD AUTO: 0 K/UL (ref 0–0.5)
EOSINOPHIL # BLD AUTO: 0 K/UL (ref 0–0.5)
EOSINOPHIL NFR BLD: 0 % (ref 0–8)
EOSINOPHIL NFR BLD: 0 % (ref 0–8)
ERYTHROCYTE [DISTWIDTH] IN BLOOD BY AUTOMATED COUNT: 13.2 % (ref 11.5–14.5)
ERYTHROCYTE [DISTWIDTH] IN BLOOD BY AUTOMATED COUNT: 13.2 % (ref 11.5–14.5)
EST. GFR  (AFRICAN AMERICAN): >60 ML/MIN/1.73 M^2
EST. GFR  (AFRICAN AMERICAN): >60 ML/MIN/1.73 M^2
EST. GFR  (NON AFRICAN AMERICAN): >60 ML/MIN/1.73 M^2
EST. GFR  (NON AFRICAN AMERICAN): >60 ML/MIN/1.73 M^2
GLUCOSE SERPL-MCNC: 113 MG/DL (ref 70–110)
GLUCOSE SERPL-MCNC: 113 MG/DL (ref 70–110)
GLUCOSE UR QL STRIP: NEGATIVE
HCT VFR BLD AUTO: 42.6 % (ref 40–54)
HCT VFR BLD AUTO: 42.6 % (ref 40–54)
HGB BLD-MCNC: 13.7 G/DL (ref 14–18)
HGB BLD-MCNC: 13.7 G/DL (ref 14–18)
HGB UR QL STRIP: ABNORMAL
HYALINE CASTS #/AREA URNS LPF: 0 /LPF
IMM GRANULOCYTES # BLD AUTO: 0.06 K/UL (ref 0–0.04)
IMM GRANULOCYTES # BLD AUTO: 0.06 K/UL (ref 0–0.04)
IMM GRANULOCYTES NFR BLD AUTO: 0.6 % (ref 0–0.5)
IMM GRANULOCYTES NFR BLD AUTO: 0.6 % (ref 0–0.5)
KETONES UR QL STRIP: NEGATIVE
LEUKOCYTE ESTERASE UR QL STRIP: NEGATIVE
LYMPHOCYTES # BLD AUTO: 0.8 K/UL (ref 1–4.8)
LYMPHOCYTES # BLD AUTO: 0.8 K/UL (ref 1–4.8)
LYMPHOCYTES NFR BLD: 7.8 % (ref 18–48)
LYMPHOCYTES NFR BLD: 7.8 % (ref 18–48)
MCH RBC QN AUTO: 32.2 PG (ref 27–31)
MCH RBC QN AUTO: 32.2 PG (ref 27–31)
MCHC RBC AUTO-ENTMCNC: 32.2 G/DL (ref 32–36)
MCHC RBC AUTO-ENTMCNC: 32.2 G/DL (ref 32–36)
MCV RBC AUTO: 100 FL (ref 82–98)
MCV RBC AUTO: 100 FL (ref 82–98)
MICROSCOPIC COMMENT: ABNORMAL
MONOCYTES # BLD AUTO: 0.8 K/UL (ref 0.3–1)
MONOCYTES # BLD AUTO: 0.8 K/UL (ref 0.3–1)
MONOCYTES NFR BLD: 8 % (ref 4–15)
MONOCYTES NFR BLD: 8 % (ref 4–15)
NEUTROPHILS # BLD AUTO: 8.6 K/UL (ref 1.8–7.7)
NEUTROPHILS # BLD AUTO: 8.6 K/UL (ref 1.8–7.7)
NEUTROPHILS NFR BLD: 83.5 % (ref 38–73)
NEUTROPHILS NFR BLD: 83.5 % (ref 38–73)
NITRITE UR QL STRIP: NEGATIVE
NRBC BLD-RTO: 0 /100 WBC
NRBC BLD-RTO: 0 /100 WBC
PH UR STRIP: 6 [PH] (ref 5–8)
PLATELET # BLD AUTO: 179 K/UL (ref 150–350)
PLATELET # BLD AUTO: 179 K/UL (ref 150–350)
PMV BLD AUTO: 11.1 FL (ref 9.2–12.9)
PMV BLD AUTO: 11.1 FL (ref 9.2–12.9)
POTASSIUM SERPL-SCNC: 4.4 MMOL/L (ref 3.5–5.1)
POTASSIUM SERPL-SCNC: 4.4 MMOL/L (ref 3.5–5.1)
PROT UR QL STRIP: ABNORMAL
RBC # BLD AUTO: 4.25 M/UL (ref 4.6–6.2)
RBC # BLD AUTO: 4.25 M/UL (ref 4.6–6.2)
RBC #/AREA URNS HPF: 10 /HPF (ref 0–4)
SODIUM SERPL-SCNC: 143 MMOL/L (ref 136–145)
SODIUM SERPL-SCNC: 143 MMOL/L (ref 136–145)
SP GR UR STRIP: >=1.03 (ref 1–1.03)
SQUAMOUS #/AREA URNS HPF: 3 /HPF
URN SPEC COLLECT METH UR: ABNORMAL
UROBILINOGEN UR STRIP-ACNC: NEGATIVE EU/DL
WBC # BLD AUTO: 10.34 K/UL (ref 3.9–12.7)
WBC # BLD AUTO: 10.34 K/UL (ref 3.9–12.7)
WBC #/AREA URNS HPF: 0 /HPF (ref 0–5)

## 2020-04-06 PROCEDURE — 22614 PR ARTHRODESIS, POST/POSTLAT, SNGL INTERSPACE, EA ADDTL: ICD-10-PCS | Mod: ,,, | Performed by: NEUROLOGICAL SURGERY

## 2020-04-06 PROCEDURE — 63600175 PHARM REV CODE 636 W HCPCS: Performed by: NEUROLOGICAL SURGERY

## 2020-04-06 PROCEDURE — 22610 PR ARTHRODESIS, POST/POSTLAT, SNGL INTERSPACE, THORACIC: ICD-10-PCS | Mod: 51,,, | Performed by: NEUROLOGICAL SURGERY

## 2020-04-06 PROCEDURE — 20936 SP BONE AGRFT LOCAL ADD-ON: CPT | Mod: ,,, | Performed by: NEUROLOGICAL SURGERY

## 2020-04-06 PROCEDURE — 61783 PR STEREOTACTIC COMP ASSIST PROC,SPINAL: ICD-10-PCS | Mod: ,,, | Performed by: NEUROLOGICAL SURGERY

## 2020-04-06 PROCEDURE — 20936 PR AUTOGRAFT SPINE SURGERY LOCAL FROM SAME INCISION: ICD-10-PCS | Mod: ,,, | Performed by: NEUROLOGICAL SURGERY

## 2020-04-06 PROCEDURE — 25000003 PHARM REV CODE 250: Performed by: NURSE ANESTHETIST, CERTIFIED REGISTERED

## 2020-04-06 PROCEDURE — 37000008 HC ANESTHESIA 1ST 15 MINUTES: Performed by: NEUROLOGICAL SURGERY

## 2020-04-06 PROCEDURE — 27201423 OPTIME MED/SURG SUP & DEVICES STERILE SUPPLY: Performed by: NEUROLOGICAL SURGERY

## 2020-04-06 PROCEDURE — 22325 TREAT SPINE FRACTURE: CPT | Mod: ,,, | Performed by: NEUROLOGICAL SURGERY

## 2020-04-06 PROCEDURE — 80048 BASIC METABOLIC PNL TOTAL CA: CPT

## 2020-04-06 PROCEDURE — 85025 COMPLETE CBC W/AUTO DIFF WBC: CPT

## 2020-04-06 PROCEDURE — 36000713 HC OR TIME LEV V EA ADD 15 MIN: Performed by: NEUROLOGICAL SURGERY

## 2020-04-06 PROCEDURE — 25000003 PHARM REV CODE 250: Performed by: NEUROLOGICAL SURGERY

## 2020-04-06 PROCEDURE — 22610 ARTHRD PST TQ 1NTRSPC THRC: CPT | Mod: 51,,, | Performed by: NEUROLOGICAL SURGERY

## 2020-04-06 PROCEDURE — 36415 COLL VENOUS BLD VENIPUNCTURE: CPT

## 2020-04-06 PROCEDURE — C1729 CATH, DRAINAGE: HCPCS | Performed by: NEUROLOGICAL SURGERY

## 2020-04-06 PROCEDURE — 51702 INSERT TEMP BLADDER CATH: CPT

## 2020-04-06 PROCEDURE — 22614 ARTHRD PST TQ 1NTRSPC EA ADD: CPT | Mod: ,,, | Performed by: NEUROLOGICAL SURGERY

## 2020-04-06 PROCEDURE — 37000009 HC ANESTHESIA EA ADD 15 MINS: Performed by: NEUROLOGICAL SURGERY

## 2020-04-06 PROCEDURE — 25000003 PHARM REV CODE 250: Performed by: PHYSICIAN ASSISTANT

## 2020-04-06 PROCEDURE — 71000039 HC RECOVERY, EACH ADD'L HOUR: Performed by: NEUROLOGICAL SURGERY

## 2020-04-06 PROCEDURE — 22842 INSERT SPINE FIXATION DEVICE: CPT | Mod: ,,, | Performed by: NEUROLOGICAL SURGERY

## 2020-04-06 PROCEDURE — 63600175 PHARM REV CODE 636 W HCPCS: Performed by: PHYSICIAN ASSISTANT

## 2020-04-06 PROCEDURE — 25000003 PHARM REV CODE 250: Performed by: NURSE PRACTITIONER

## 2020-04-06 PROCEDURE — 22842 PR POSTERIOR SEGMENTAL INSTRUMENTATION 3-6 VRT SEG: ICD-10-PCS | Mod: ,,, | Performed by: NEUROLOGICAL SURGERY

## 2020-04-06 PROCEDURE — 63600175 PHARM REV CODE 636 W HCPCS: Performed by: NURSE ANESTHETIST, CERTIFIED REGISTERED

## 2020-04-06 PROCEDURE — 27800903 OPTIME MED/SURG SUP & DEVICES OTHER IMPLANTS: Performed by: NEUROLOGICAL SURGERY

## 2020-04-06 PROCEDURE — 71000033 HC RECOVERY, INTIAL HOUR: Performed by: NEUROLOGICAL SURGERY

## 2020-04-06 PROCEDURE — 22325 PR OPEN POST RX LUMB VERT FX,1 LVL: ICD-10-PCS | Mod: ,,, | Performed by: NEUROLOGICAL SURGERY

## 2020-04-06 PROCEDURE — 36000712 HC OR TIME LEV V 1ST 15 MIN: Performed by: NEUROLOGICAL SURGERY

## 2020-04-06 PROCEDURE — 99900035 HC TECH TIME PER 15 MIN (STAT)

## 2020-04-06 PROCEDURE — 63600175 PHARM REV CODE 636 W HCPCS: Performed by: ANESTHESIOLOGY

## 2020-04-06 PROCEDURE — 99223 1ST HOSP IP/OBS HIGH 75: CPT | Mod: 57,,, | Performed by: NEUROLOGICAL SURGERY

## 2020-04-06 PROCEDURE — 99223 PR INITIAL HOSPITAL CARE,LEVL III: ICD-10-PCS | Mod: 57,,, | Performed by: NEUROLOGICAL SURGERY

## 2020-04-06 PROCEDURE — 81000 URINALYSIS NONAUTO W/SCOPE: CPT

## 2020-04-06 PROCEDURE — 63600175 PHARM REV CODE 636 W HCPCS: Performed by: NURSE PRACTITIONER

## 2020-04-06 PROCEDURE — 20930 SP BONE ALGRFT MORSEL ADD-ON: CPT | Mod: ,,, | Performed by: NEUROLOGICAL SURGERY

## 2020-04-06 PROCEDURE — 61783 SCAN PROC SPINAL: CPT | Mod: ,,, | Performed by: NEUROLOGICAL SURGERY

## 2020-04-06 PROCEDURE — C1713 ANCHOR/SCREW BN/BN,TIS/BN: HCPCS | Performed by: NEUROLOGICAL SURGERY

## 2020-04-06 PROCEDURE — 20930 PR ALLOGRAFT FOR SPINE SURGERY ONLY MORSELIZED: ICD-10-PCS | Mod: ,,, | Performed by: NEUROLOGICAL SURGERY

## 2020-04-06 PROCEDURE — C9290 INJ, BUPIVACAINE LIPOSOME: HCPCS | Performed by: NEUROLOGICAL SURGERY

## 2020-04-06 PROCEDURE — 11000001 HC ACUTE MED/SURG PRIVATE ROOM

## 2020-04-06 PROCEDURE — 94799 UNLISTED PULMONARY SVC/PX: CPT

## 2020-04-06 DEVICE — SCREW HORIZON SOLERA 6.5X40: Type: IMPLANTABLE DEVICE | Site: BACK | Status: FUNCTIONAL

## 2020-04-06 DEVICE — SET SCREW HORIZON SOLERA 5.5-6: Type: IMPLANTABLE DEVICE | Site: BACK | Status: FUNCTIONAL

## 2020-04-06 DEVICE — KIT BONE GRFT BMP SM: Type: IMPLANTABLE DEVICE | Site: BACK | Status: FUNCTIONAL

## 2020-04-06 DEVICE — IMPLANTABLE DEVICE: Type: IMPLANTABLE DEVICE | Site: BACK | Status: FUNCTIONAL

## 2020-04-06 RX ORDER — ROCURONIUM BROMIDE 10 MG/ML
INJECTION, SOLUTION INTRAVENOUS
Status: DISCONTINUED | OUTPATIENT
Start: 2020-04-06 | End: 2020-04-06

## 2020-04-06 RX ORDER — BACITRACIN ZINC 500 UNIT/G
OINTMENT (GRAM) TOPICAL
Status: DISCONTINUED | OUTPATIENT
Start: 2020-04-06 | End: 2020-04-06 | Stop reason: HOSPADM

## 2020-04-06 RX ORDER — HYDROMORPHONE HYDROCHLORIDE 2 MG/ML
0.2 INJECTION, SOLUTION INTRAMUSCULAR; INTRAVENOUS; SUBCUTANEOUS EVERY 5 MIN PRN
Status: DISCONTINUED | OUTPATIENT
Start: 2020-04-06 | End: 2020-04-06 | Stop reason: HOSPADM

## 2020-04-06 RX ORDER — PROMETHAZINE HYDROCHLORIDE 25 MG/ML
INJECTION, SOLUTION INTRAMUSCULAR; INTRAVENOUS
Status: DISCONTINUED | OUTPATIENT
Start: 2020-04-06 | End: 2020-04-06

## 2020-04-06 RX ORDER — KETOROLAC TROMETHAMINE 30 MG/ML
15 INJECTION, SOLUTION INTRAMUSCULAR; INTRAVENOUS EVERY 8 HOURS PRN
Status: DISCONTINUED | OUTPATIENT
Start: 2020-04-06 | End: 2020-04-06 | Stop reason: HOSPADM

## 2020-04-06 RX ORDER — GLYCOPYRROLATE 0.2 MG/ML
INJECTION INTRAMUSCULAR; INTRAVENOUS
Status: DISCONTINUED | OUTPATIENT
Start: 2020-04-06 | End: 2020-04-06

## 2020-04-06 RX ORDER — FENTANYL CITRATE 50 UG/ML
INJECTION, SOLUTION INTRAMUSCULAR; INTRAVENOUS
Status: DISCONTINUED | OUTPATIENT
Start: 2020-04-06 | End: 2020-04-06

## 2020-04-06 RX ORDER — SUCCINYLCHOLINE CHLORIDE 20 MG/ML
INJECTION INTRAMUSCULAR; INTRAVENOUS
Status: DISCONTINUED | OUTPATIENT
Start: 2020-04-06 | End: 2020-04-06

## 2020-04-06 RX ORDER — BACITRACIN 50000 [IU]/1
INJECTION, POWDER, FOR SOLUTION INTRAMUSCULAR
Status: DISCONTINUED | OUTPATIENT
Start: 2020-04-06 | End: 2020-04-06 | Stop reason: HOSPADM

## 2020-04-06 RX ORDER — PHENYLEPHRINE HYDROCHLORIDE 10 MG/ML
INJECTION INTRAVENOUS
Status: DISCONTINUED | OUTPATIENT
Start: 2020-04-06 | End: 2020-04-06

## 2020-04-06 RX ORDER — VANCOMYCIN HCL IN 5 % DEXTROSE 1.5G/250ML
1500 PLASTIC BAG, INJECTION (ML) INTRAVENOUS
Status: COMPLETED | OUTPATIENT
Start: 2020-04-06 | End: 2020-04-07

## 2020-04-06 RX ORDER — EPHEDRINE SULFATE 50 MG/ML
INJECTION, SOLUTION INTRAVENOUS
Status: DISCONTINUED | OUTPATIENT
Start: 2020-04-06 | End: 2020-04-06

## 2020-04-06 RX ORDER — AMOXICILLIN 250 MG
2 CAPSULE ORAL NIGHTLY PRN
Status: DISCONTINUED | OUTPATIENT
Start: 2020-04-06 | End: 2020-04-09 | Stop reason: HOSPADM

## 2020-04-06 RX ORDER — SODIUM CHLORIDE, SODIUM LACTATE, POTASSIUM CHLORIDE, CALCIUM CHLORIDE 600; 310; 30; 20 MG/100ML; MG/100ML; MG/100ML; MG/100ML
INJECTION, SOLUTION INTRAVENOUS CONTINUOUS PRN
Status: DISCONTINUED | OUTPATIENT
Start: 2020-04-06 | End: 2020-04-06

## 2020-04-06 RX ORDER — MIDAZOLAM HYDROCHLORIDE 1 MG/ML
INJECTION, SOLUTION INTRAMUSCULAR; INTRAVENOUS
Status: DISCONTINUED | OUTPATIENT
Start: 2020-04-06 | End: 2020-04-06

## 2020-04-06 RX ORDER — NALOXONE HCL 0.4 MG/ML
0.02 VIAL (ML) INJECTION
Status: DISCONTINUED | OUTPATIENT
Start: 2020-04-06 | End: 2020-04-09 | Stop reason: HOSPADM

## 2020-04-06 RX ORDER — VANCOMYCIN HYDROCHLORIDE 1 G/20ML
INJECTION, POWDER, LYOPHILIZED, FOR SOLUTION INTRAVENOUS
Status: DISCONTINUED | OUTPATIENT
Start: 2020-04-06 | End: 2020-04-06 | Stop reason: HOSPADM

## 2020-04-06 RX ORDER — DEXAMETHASONE SODIUM PHOSPHATE 4 MG/ML
INJECTION, SOLUTION INTRA-ARTICULAR; INTRALESIONAL; INTRAMUSCULAR; INTRAVENOUS; SOFT TISSUE
Status: DISCONTINUED | OUTPATIENT
Start: 2020-04-06 | End: 2020-04-06

## 2020-04-06 RX ORDER — LIDOCAINE HYDROCHLORIDE 10 MG/ML
INJECTION, SOLUTION EPIDURAL; INFILTRATION; INTRACAUDAL; PERINEURAL
Status: DISCONTINUED | OUTPATIENT
Start: 2020-04-06 | End: 2020-04-06

## 2020-04-06 RX ORDER — BISACODYL 5 MG
5 TABLET, DELAYED RELEASE (ENTERIC COATED) ORAL DAILY PRN
Status: DISCONTINUED | OUTPATIENT
Start: 2020-04-06 | End: 2020-04-09 | Stop reason: HOSPADM

## 2020-04-06 RX ORDER — ACETAMINOPHEN 10 MG/ML
INJECTION, SOLUTION INTRAVENOUS
Status: DISCONTINUED | OUTPATIENT
Start: 2020-04-06 | End: 2020-04-06

## 2020-04-06 RX ORDER — HYDROMORPHONE HCL IN 0.9% NACL 6 MG/30 ML
PATIENT CONTROLLED ANALGESIA SYRINGE INTRAVENOUS CONTINUOUS
Status: DISPENSED | OUTPATIENT
Start: 2020-04-06 | End: 2020-04-07

## 2020-04-06 RX ORDER — NEOSTIGMINE METHYLSULFATE 1 MG/ML
INJECTION, SOLUTION INTRAVENOUS
Status: DISCONTINUED | OUTPATIENT
Start: 2020-04-06 | End: 2020-04-06

## 2020-04-06 RX ORDER — PROPOFOL 10 MG/ML
VIAL (ML) INTRAVENOUS
Status: DISCONTINUED | OUTPATIENT
Start: 2020-04-06 | End: 2020-04-06

## 2020-04-06 RX ORDER — MAG HYDROX/ALUMINUM HYD/SIMETH 200-200-20
30 SUSPENSION, ORAL (FINAL DOSE FORM) ORAL EVERY 4 HOURS PRN
Status: DISCONTINUED | OUTPATIENT
Start: 2020-04-06 | End: 2020-04-09 | Stop reason: HOSPADM

## 2020-04-06 RX ORDER — BUPIVACAINE HYDROCHLORIDE 2.5 MG/ML
INJECTION, SOLUTION EPIDURAL; INFILTRATION; INTRACAUDAL
Status: DISCONTINUED | OUTPATIENT
Start: 2020-04-06 | End: 2020-04-06 | Stop reason: HOSPADM

## 2020-04-06 RX ADMIN — EPHEDRINE SULFATE 10 MG: 50 INJECTION INTRAVENOUS at 11:04

## 2020-04-06 RX ADMIN — SODIUM CHLORIDE, SODIUM LACTATE, POTASSIUM CHLORIDE, AND CALCIUM CHLORIDE: 600; 310; 30; 20 INJECTION, SOLUTION INTRAVENOUS at 09:04

## 2020-04-06 RX ADMIN — VANCOMYCIN HYDROCHLORIDE 1500 MG: 1 INJECTION, POWDER, LYOPHILIZED, FOR SOLUTION INTRAVENOUS at 10:04

## 2020-04-06 RX ADMIN — HYDROMORPHONE HYDROCHLORIDE 0.2 MG: 2 INJECTION INTRAMUSCULAR; INTRAVENOUS; SUBCUTANEOUS at 03:04

## 2020-04-06 RX ADMIN — LIDOCAINE HYDROCHLORIDE 80 MG: 10 INJECTION, SOLUTION EPIDURAL; INFILTRATION; INTRACAUDAL; PERINEURAL at 09:04

## 2020-04-06 RX ADMIN — ROCURONIUM BROMIDE 5 MG: 10 INJECTION, SOLUTION INTRAVENOUS at 09:04

## 2020-04-06 RX ADMIN — ROBINUL 0.8 MG: 0.2 INJECTION INTRAMUSCULAR; INTRAVENOUS at 02:04

## 2020-04-06 RX ADMIN — ROCURONIUM BROMIDE 10 MG: 10 INJECTION, SOLUTION INTRAVENOUS at 10:04

## 2020-04-06 RX ADMIN — ROCURONIUM BROMIDE 10 MG: 10 INJECTION, SOLUTION INTRAVENOUS at 11:04

## 2020-04-06 RX ADMIN — ROCURONIUM BROMIDE 10 MG: 10 INJECTION, SOLUTION INTRAVENOUS at 01:04

## 2020-04-06 RX ADMIN — HYDROMORPHONE HYDROCHLORIDE 1 MG: 1 INJECTION, SOLUTION INTRAMUSCULAR; INTRAVENOUS; SUBCUTANEOUS at 03:04

## 2020-04-06 RX ADMIN — Medication: at 03:04

## 2020-04-06 RX ADMIN — PHENYLEPHRINE HYDROCHLORIDE 100 MCG: 10 INJECTION INTRAVENOUS at 10:04

## 2020-04-06 RX ADMIN — FENTANYL CITRATE 100 MCG: 50 INJECTION, SOLUTION INTRAMUSCULAR; INTRAVENOUS at 09:04

## 2020-04-06 RX ADMIN — HYDROMORPHONE HYDROCHLORIDE 1 MG: 1 INJECTION, SOLUTION INTRAMUSCULAR; INTRAVENOUS; SUBCUTANEOUS at 07:04

## 2020-04-06 RX ADMIN — NEOSTIGMINE METHYLSULFATE 5 MG: 1 INJECTION INTRAVENOUS at 02:04

## 2020-04-06 RX ADMIN — PHENYLEPHRINE HYDROCHLORIDE 100 MCG: 10 INJECTION INTRAVENOUS at 09:04

## 2020-04-06 RX ADMIN — FENTANYL CITRATE 25 MCG: 50 INJECTION, SOLUTION INTRAMUSCULAR; INTRAVENOUS at 02:04

## 2020-04-06 RX ADMIN — ATORVASTATIN CALCIUM 20 MG: 10 TABLET, FILM COATED ORAL at 08:04

## 2020-04-06 RX ADMIN — VANCOMYCIN HYDROCHLORIDE 1 G: 1 INJECTION, POWDER, LYOPHILIZED, FOR SOLUTION INTRAVENOUS at 10:04

## 2020-04-06 RX ADMIN — PROPOFOL 150 MG: 10 INJECTION, EMULSION INTRAVENOUS at 09:04

## 2020-04-06 RX ADMIN — METHOCARBAMOL TABLETS 750 MG: 750 TABLET, COATED ORAL at 08:04

## 2020-04-06 RX ADMIN — MIDAZOLAM 2 MG: 1 INJECTION INTRAMUSCULAR; INTRAVENOUS at 09:04

## 2020-04-06 RX ADMIN — FENTANYL CITRATE 50 MCG: 50 INJECTION, SOLUTION INTRAMUSCULAR; INTRAVENOUS at 10:04

## 2020-04-06 RX ADMIN — FENTANYL CITRATE 50 MCG: 50 INJECTION, SOLUTION INTRAMUSCULAR; INTRAVENOUS at 01:04

## 2020-04-06 RX ADMIN — FENTANYL CITRATE 50 MCG: 50 INJECTION, SOLUTION INTRAMUSCULAR; INTRAVENOUS at 11:04

## 2020-04-06 RX ADMIN — SODIUM CHLORIDE: 0.9 INJECTION, SOLUTION INTRAVENOUS at 07:04

## 2020-04-06 RX ADMIN — EPHEDRINE SULFATE 10 MG: 50 INJECTION INTRAVENOUS at 10:04

## 2020-04-06 RX ADMIN — BUPIVACAINE 266 MG: 13.3 INJECTION, SUSPENSION, LIPOSOMAL INFILTRATION at 08:04

## 2020-04-06 RX ADMIN — PROMETHAZINE HYDROCHLORIDE 6.25 MG: 25 INJECTION INTRAMUSCULAR; INTRAVENOUS at 01:04

## 2020-04-06 RX ADMIN — DEXAMETHASONE SODIUM PHOSPHATE 4 MG: 4 INJECTION, SOLUTION INTRA-ARTICULAR; INTRALESIONAL; INTRAMUSCULAR; INTRAVENOUS; SOFT TISSUE at 10:04

## 2020-04-06 RX ADMIN — ROCURONIUM BROMIDE 20 MG: 10 INJECTION, SOLUTION INTRAVENOUS at 10:04

## 2020-04-06 RX ADMIN — ROCURONIUM BROMIDE 35 MG: 10 INJECTION, SOLUTION INTRAVENOUS at 09:04

## 2020-04-06 RX ADMIN — METHOCARBAMOL TABLETS 750 MG: 750 TABLET, COATED ORAL at 04:04

## 2020-04-06 RX ADMIN — ACETAMINOPHEN 1000 MG: 10 INJECTION, SOLUTION INTRAVENOUS at 01:04

## 2020-04-06 RX ADMIN — SODIUM CHLORIDE, SODIUM LACTATE, POTASSIUM CHLORIDE, AND CALCIUM CHLORIDE: 600; 310; 30; 20 INJECTION, SOLUTION INTRAVENOUS at 12:04

## 2020-04-06 RX ADMIN — SUCCINYLCHOLINE CHLORIDE 180 MG: 20 INJECTION, SOLUTION INTRAMUSCULAR; INTRAVENOUS at 09:04

## 2020-04-06 NOTE — OP NOTE
[facility] -   Neurosurgery  Operative Note    SUMMARY      Date of Procedure: 4/6/2020     Procedure: Procedure(s) (LRB):  FUSION, SPINE, POSTERIOR SPINAL COLUMN, LUMBAR, USING COMPUTER-ASSISTED NAVIGATION   (Bilateral)   T11-L3 for L1 burst fracture     Surgeon(s) and Role:     * Donaldo Orlando MD - Primary    Assisting Surgeon: None    Pre-Operative Diagnosis: Closed burst fracture of lumbar vertebra, initial encounter [S32.001A] L1    Post-Operative Diagnosis: Post-Op Diagnosis Codes:     * Closed burst fracture of lumbar vertebra, initial encounter [S32.001A]    Anesthesia: General    Technical Procedures Used:    T11-L3 pedicle screw fusion     Description of the Findings of the Procedure:   L1 burst fracture reduced on pre and intraop imaging     Significant Surgical Tasks Conducted by the Assistant(s), if Applicable: retraction during reduction     Complications: No    Estimated Blood Loss (EBL): 600 mL           Specimens:   Specimen (12h ago, onward)    None           Implants:   Implant Name Type Inv. Item Serial No.  Lot No. LRB No. Used   KIT BONE GRFT BMP SM - SN/A  KIT BONE GRFT BMP SM N/A [employer] [id number] N/A 1   [id number] Bone  [id number] [employer]-NEURO N/A N/A 1   SCREW HORIZON SOLERA 6.5X40 - SN/A  SCREW HORIZON SOLERA 6.5X40 N/A [employer] N/A N/A 4   6.5 x 45    N/A [employer]-NEURO N/A N/A 4   5.5 x 35   N/A [employer]-NEURO N/A N/A 1   39mm CROSSLINK   N/A [employer]-NEURO N/A N/A 1   500mm JOSE   N/A [employer]-NEURO N/A N/A 2   SET SCREW HORIZON SOLERA 5.5-6 - SN/A  SET SCREW HORIZON SOLERA 5.5-6 N/A [employer] N/A N/A 9              Condition: Good    Disposition: PACU - hemodynamically stable.    Attestation: I was present and scrubbed for the entire procedure.

## 2020-04-06 NOTE — ASSESSMENT & PLAN NOTE
CT scan revealed L1 burst fracture with retropulsion of fragment posteriorly   Neurosurgery consulted   Plan for surgery tomorrow  NPO after midnight   Neurovascular checks   Analgesics prn  4/6/20   S/p spinal fusion per Dr. Orlando today. Pt tolerated procedure well. Pain controlled on PCA pump.   PT/OT consult

## 2020-04-06 NOTE — SUBJECTIVE & OBJECTIVE
Interval History: Pt eating dinner in bed. S/p spinal fusion per Dr. Orlando today. Pt tolerated procedure well. Pain controlled on PCA pump.       Review of Systems   Constitutional: Negative.  Negative for activity change, appetite change, chills, diaphoresis, fatigue and fever.   HENT: Negative.    Eyes: Negative.    Respiratory: Negative.  Negative for cough and shortness of breath.    Cardiovascular: Negative.  Negative for chest pain, palpitations and leg swelling.   Gastrointestinal: Negative.    Endocrine: Negative.    Genitourinary: Negative.  Negative for dysuria, flank pain and urgency.   Musculoskeletal: Positive for back pain.   Skin: Negative.    Allergic/Immunologic: Negative.    Neurological: Negative.  Negative for dizziness, syncope, weakness, numbness and headaches.   Hematological: Negative.    Psychiatric/Behavioral: Negative.      Objective:     Vital Signs (Most Recent):  Temp: 97.8 °F (36.6 °C) (04/06/20 1704)  Pulse: 93 (04/06/20 1704)  Resp: 20 (04/06/20 1704)  BP: 138/73 (04/06/20 1704)  SpO2: 96 % (04/06/20 1704) Vital Signs (24h Range):  Temp:  [97.8 °F (36.6 °C)-99.8 °F (37.7 °C)] 97.8 °F (36.6 °C)  Pulse:  [74-93] 93  Resp:  [9-47] 20  SpO2:  [91 %-98 %] 96 %  BP: (103-154)/() 138/73     Weight: 123 kg (271 lb 1.6 oz)  Body mass index is 36.77 kg/m².    Intake/Output Summary (Last 24 hours) at 4/6/2020 1425  Last data filed at 4/6/2020 1617  Gross per 24 hour   Intake 3407 ml   Output 1800 ml   Net 1607 ml      Physical Exam   Constitutional: He is oriented to person, place, and time. He appears well-developed and well-nourished.   HENT:   Head: Normocephalic and atraumatic.   Eyes: Pupils are equal, round, and reactive to light. EOM are normal.   Neck: Normal range of motion. Neck supple.   Cardiovascular: Normal rate, regular rhythm, normal heart sounds, intact distal pulses and normal pulses.   Pulmonary/Chest: Effort normal and breath sounds normal. No accessory muscle usage.  No tachypnea. No respiratory distress.   Abdominal: Soft. Normal appearance and bowel sounds are normal. There is no tenderness.   Musculoskeletal:        Lumbar back: He exhibits decreased range of motion (secondary to pain ), tenderness and pain.        Arms:  Neurological: He is alert and oriented to person, place, and time. He has normal reflexes.   Skin: Skin is warm, dry and intact. Capillary refill takes less than 2 seconds.   Psychiatric: He has a normal mood and affect. His speech is normal and behavior is normal. Judgment and thought content normal. Cognition and memory are normal.   Nursing note and vitals reviewed.      Significant Labs:   BMP:   Recent Labs   Lab 04/06/20  0501   *  113*     143   K 4.4  4.4   *  111*   CO2 23  23   BUN 24*  24*   CREATININE 1.1  1.1   CALCIUM 8.7  8.7     CBC:   Recent Labs   Lab 04/05/20  1428 04/06/20  0501   WBC 9.67 10.34  10.34   HGB 13.5* 13.7*  13.7*   HCT 42.0 42.6  42.6    179  179     CMP:   Recent Labs   Lab 04/05/20  1428 04/06/20  0501    143  143   K 4.2 4.4  4.4   * 111*  111*   CO2 22* 23  23    113*  113*   BUN 20 24*  24*   CREATININE 1.6* 1.1  1.1   CALCIUM 8.6* 8.7  8.7   PROT 6.5  --    ALBUMIN 3.5  --    BILITOT 0.4  --    ALKPHOS 75  --    AST 29  --    ALT 35  --    ANIONGAP 10 9  9   EGFRNONAA 45* >60  >60       Significant Imaging:   Imaging Results          CT Lumbar Spine Without Contrast (Final result)  Result time 04/05/20 15:34:45    Final result by Rizwan Albarran MD (Timothy) (04/05/20 15:34:45)                 Impression:      Severely comminuted fracture involving the L1 vertebral body with associated decreased vertebral body height.  Large retropulsed fracture fragment causing moderate spinal stenosis.  There is also a vertical component extending through the lamina of L1 on the left.    Extensive right nephrolithiasis.  8 mm stone involving the left renal pelvis.  No  definite hydronephrosis.    All CT scans at this facility are performed  using dose modulation techniques as appropriate to performed exam including the following:  automated exposure control; adjustment of mA and/or kV according to the patients size (this includes techniques or standardized protocols for targeted exams where dose is matched to indication/reason for exam: i.e. extremities or head);  iterative reconstruction technique.      Electronically signed by: Rizwan Albarran MD  Date:    04/05/2020  Time:    15:34             Narrative:    EXAMINATION:  CT LUMBAR SPINE WITHOUT CONTRAST    CLINICAL HISTORY:  Spine fracture, traumatic, lumbar;Low back pain, risk factors (osteoporosis or chronic steroid use or elderly);    TECHNIQUE:  Standard noncontrast CT scan of the lumbar spine.    COMPARISON:  None    FINDINGS:  There is a comminuted fracture involving the L1 vertebral body associated with decreased vertebral body height.  There is a large retropulsed fracture fragment noted measuring 1.7 by 1.3 cm causing moderate spinal stenosis.  There is also a component of the fracture extending through the lamina on L1 left.    No other fractures identified.  No disc herniations.  There is evidence of facet arthropathy bilaterally at L4-5 and L5-S1.    There is evidence of nephrolithiasis involving the right kidney.  There is a 8 mm stone in the left renal pelvis.  No definite hydronephrosis.

## 2020-04-06 NOTE — PLAN OF CARE
Contacted patient via telephone to complete initial assessment. Patient lives with his spouse Elizabeth and is usually very independent. Patient is having surgery this am and discharge plan is to be determined pending surgery and progress with therapy post surgery.Added his spouse to contact list. CM will continue to follow for discharge needs.  Patient was leaving for surgery and requested that I check back with him after surgery.          D/C Plan: Rehab vs home health  PCP:   Preferred Pharmacy:  Discharge transportation: pov  My [facility]:inactive  Pharmacy Bedside Delivery: TBD             04/06/20 0903   Discharge Assessment   Assessment Type Discharge Planning Assessment   Confirmed/corrected address and phone number on facesheet? Yes   Assessment information obtained from? Patient;Medical Record   Expected Length of Stay (days)   (tbd)   Communicated expected length of stay with patient/caregiver no   Prior to hospitilization cognitive status: Alert/Oriented   Prior to hospitalization functional status: Independent   Current cognitive status: Alert/Oriented   Current Functional Status: Partially Dependent   Facility Arrived From: home   Lives With spouse   Able to Return to Prior Arrangements other (see comments)   Is patient able to care for self after discharge? Unable to determine at this time (comments)   Who are your caregiver(s) and their phone number(s)? Elizabeth Frankel ( spouse ) [phone]   Patient's perception of discharge disposition rehab facility;home health;home or selfcare   Readmission Within the Last 30 Days no previous admission in last 30 days   Patient currently being followed by outpatient case management? No   Patient currently receives any other outside agency services? No   Equipment Currently Used at Home none   Do you have any problems affording any of your prescribed medications? TBD   Is the patient taking medications as prescribed? yes   Does the patient have transportation home? Yes    Transportation Anticipated family or friend will provide   Does the patient receive services at the [facility]? No   Discharge Plan A Rehab   Discharge Plan B Home Health   DME Needed Upon Discharge  other (see comments)  (tbd)   Patient/Family in Agreement with Plan unable to assess

## 2020-04-06 NOTE — ANESTHESIA PREPROCEDURE EVALUATION
04/06/2020  Eliud Frankel is a 64 y.o., male.    Anesthesia Evaluation    I have reviewed the Patient Summary Reports.    I have reviewed the Nursing Notes.   I have reviewed the Medications.     Review of Systems  Anesthesia Hx:  No problems with previous Anesthesia    Social:  Non-Smoker    Hematology/Oncology:  Hematology Normal        Cardiovascular:   Hypertension hyperlipidemia    Pulmonary:  Pulmonary Normal    Renal/:  Renal/ Normal     Hepatic/GI:   GERD    Musculoskeletal:   Fall (on) (from) unspecified stairs and steps, initial encounter     L1 burst fracture    Neurological:  Neurology Normal    Endocrine:  Endocrine Normal        Physical Exam  General:  Well nourished    Airway/Jaw/Neck:  Airway Findings: Mouth Opening: Normal Tongue: Normal  General Airway Assessment: Adult  Mallampati: II  TM Distance: 4 - 6 cm  Jaw/Neck Findings:  Neck ROM: Normal ROM      Dental:  Dental Findings: In tact   Chest/Lungs:  Chest/Lungs Findings: Clear to auscultation, Normal Respiratory Rate     Heart/Vascular:  Heart Findings: Rate: Normal  Rhythm: Regular Rhythm  Sounds: Normal        Mental Status:  Mental Status Findings:  Cooperative, Alert and Oriented         Anesthesia Plan  Type of Anesthesia, risks & benefits discussed:  Anesthesia Type:  general  Patient's Preference:   Intra-op Monitoring Plan: standard ASA monitors  Intra-op Monitoring Plan Comments:   Post Op Pain Control Plan: multimodal analgesia and per primary service following discharge from PACU  Post Op Pain Control Plan Comments:   Induction:   IV  Beta Blocker:  Patient is on a Beta-Blocker and has received one dose within the past 24 hours (No further documentation required).       Informed Consent: Patient understands risks and agrees with Anesthesia plan.  Questions answered. Anesthesia consent signed with patient.  ASA Score:  2     Day of Surgery Review of History & Physical:  There are no significant changes.          Ready For Surgery From Anesthesia Perspective.     Patient Active Problem List   Diagnosis    Fall (on) (from) unspecified stairs and steps, initial encounter    Hypertension    Hyperlipidemia    GERD (gastroesophageal reflux disease)    GUSTABO (acute kidney injury)       Chemistry        Component Value Date/Time     04/06/2020 0501     04/06/2020 0501    K 4.4 04/06/2020 0501    K 4.4 04/06/2020 0501     (H) 04/06/2020 0501     (H) 04/06/2020 0501    CO2 23 04/06/2020 0501    CO2 23 04/06/2020 0501    BUN 24 (H) 04/06/2020 0501    BUN 24 (H) 04/06/2020 0501    CREATININE 1.1 04/06/2020 0501    CREATININE 1.1 04/06/2020 0501     (H) 04/06/2020 0501     (H) 04/06/2020 0501        Component Value Date/Time    CALCIUM 8.7 04/06/2020 0501    CALCIUM 8.7 04/06/2020 0501    ALKPHOS 75 04/05/2020 1428    AST 29 04/05/2020 1428    ALT 35 04/05/2020 1428    BILITOT 0.4 04/05/2020 1428    ESTGFRAFRICA >60 04/06/2020 0501    ESTGFRAFRICA >60 04/06/2020 0501    EGFRNONAA >60 04/06/2020 0501    EGFRNONAA >60 04/06/2020 0501        Lab Results   Component Value Date    WBC 10.34 04/06/2020    WBC 10.34 04/06/2020    HGB 13.7 (L) 04/06/2020    HGB 13.7 (L) 04/06/2020    HCT 42.6 04/06/2020    HCT 42.6 04/06/2020     (H) 04/06/2020     (H) 04/06/2020     04/06/2020     04/06/2020     Normal sinus rhythm  Left axis deviation  Minimal voltage criteria for LVH, may be normal variant  Nonspecific ST abnormality  Abnormal ECG  No previous ECGs available

## 2020-04-06 NOTE — ASSESSMENT & PLAN NOTE
Continue amlodipine and BB  ACEi held due to GUSTABO   BP stable   4/6/20  Bp stable will resume ACEi in morning

## 2020-04-06 NOTE — TRANSFER OF CARE
Anesthesia Transfer of Care Note    Patient: Eliud Frankel    Procedure(s) Performed: Procedure(s) (LRB):  FUSION, SPINE, POSTERIOR SPINAL COLUMN, LUMBAR, USING COMPUTER-ASSISTED NAVIGATION   (Bilateral)    Patient location: PACU    Anesthesia Type: general    Transport from OR: Transported from OR on room air with adequate spontaneous ventilation    Post pain: adequate analgesia    Post assessment: no apparent anesthetic complications    Post vital signs: stable    Level of consciousness: awake and alert    Nausea/Vomiting: no nausea/vomiting    Complications: none    Transfer of care protocol was followed      Last vitals:   Visit Vitals  BP (!) 145/87 (BP Location: Right arm, Patient Position: Lying)   Pulse 82   Temp 37.3 °C (99.2 °F) (Oral)   Resp 18   Ht 6' (1.829 m)   Wt 123 kg (271 lb 1.6 oz)   SpO2 95%   BMI 36.77 kg/m²

## 2020-04-06 NOTE — HOSPITAL COURSE
64 year old admitted for spinal fracture. CT showed L1 burst fx with 3 column injury. Neurosurgery consulted. MRI shows acute compression fracture of L1 with associated 30% loss of height.  Retropulsion of the dorsal cortex of L1 with moderate to severe central canal stenosis with probable distal cord/conus impingement.  Paraspinal soft tissue edema. As of 4/6 pt s/p spinal fusion per Dr. Orlando today. Pt tolerated procedure well. Pain controlled on PCA pump. As of 4/7 POD #1, patient lying in bed. Pain controlled on PCA will transition to po pain meds. Reports back pain with movement only. PT/OT consult pending, further recommendations to follow. TLSO brace ordered. As of 4/8 pt had abdominal pain overnight, Abdominal  imaging reviewed concerning for ileus. CXR showed jess patchy infiltrates however appears to be atelectasis. WBCs normal, afebrile. PO levaquin started empirically. Pt encouraged to ambulate and use IS. Pt verbalized understanding. As of 4/9 pt is in good spirits. Overnight pt had two large bowel movements. Labs reviewed and stable. Discussed case with Dr. Orlando, ok to discharge from neurosurgery standpoint. Home health arranged with [facility] . Pt encouraged to continue IS use. Pt medically stable for discharge. Home meds reconciled. New rx given and delivered to bedside per [facility] outpt pharmacy. Pt to follow up with PCP in 3 days for hospital follow up. Pt also to follow up with Dr. Orlando for postop follow up and staple removal in 10 days. Pt seen and examined on the day of discharge and found suitable for discharge.

## 2020-04-06 NOTE — PLAN OF CARE
Fall precautions maintained. Pt free from injuries/fall.  Repositions and ambulates with assist x1- order for PT assist with ambulation.  C/o back pain- PCA in use.   Bed locked and low, side rails up x 2, phone and call light w/in reach.   POC and meds discussed, pt verbalized understanding.   Chart check done. Will cont to monitor.

## 2020-04-06 NOTE — ANESTHESIA POSTPROCEDURE EVALUATION
Anesthesia Post Evaluation    Patient: Eliud Frankel    Procedure(s) Performed: Procedure(s) (LRB):  FUSION, SPINE, POSTERIOR SPINAL COLUMN, LUMBAR, USING COMPUTER-ASSISTED NAVIGATION   (Bilateral)    Final Anesthesia Type: general    Patient location during evaluation: PACU  Patient participation: Yes- Able to Participate  Level of consciousness: awake and alert  Post-procedure vital signs: reviewed and stable  Pain management: adequate  Airway patency: patent  CAMILLA mitigation strategies: Extubation while patient is awake  PONV status at discharge: No PONV  Anesthetic complications: no      Cardiovascular status: hemodynamically stable  Respiratory status: spontaneous ventilation  Hydration status: euvolemic  Follow-up not needed.          Vitals Value Taken Time   /69 4/6/2020  3:56 PM   Temp 37.2 °C (98.9 °F) 4/6/2020  3:55 PM   Pulse 90 4/6/2020  4:00 PM   Resp 16 4/6/2020  3:58 PM   SpO2 97 % 4/6/2020  4:00 PM   Vitals shown include unvalidated device data.      Event Time     Out of Recovery 04/06/2020 16:01:00          Pain/Denis Score: Pain Rating Prior to Med Admin: 6 (4/6/2020  3:30 PM)  Pain Rating Post Med Admin: 3 (4/6/2020  4:05 AM)  Denis Score: 7 (4/6/2020  3:15 PM)

## 2020-04-06 NOTE — PLAN OF CARE
Pt remained free of injury during shift, stable condition, pain adequately controlled with PRN medication, no acute distress, receiving IV fluids, neurochecks/neurovascular checks performed per order, Mckinley placed for immobilization, and will continue to monitor. 24hr chart review performed.

## 2020-04-06 NOTE — PROGRESS NOTES
[facility] Hospital Medicine  Progress Note    Patient Name: Eliud Frankel  MRN: [id number]  Patient Class: IP- Inpatient   Admission Date: 4/5/2020  Length of Stay: 1 days  Attending Physician: Xiao Alvarez MD  Primary Care Provider: Primary Doctor No        Subjective:     Principal Problem:Fall (on) (from) unspecified stairs and steps, initial encounter        HPI:  Eliud Frankel is a 64 y.o. male patient with a PMHx of GERD, HLD, and HTN who presents to the Emergency Department for evaluation of fall from ladder of approximately 8 ft which onset suddenly just PTA. Symptoms are constant and moderate in severity. No mitigating or exacerbating factors reported. Associated sxs include right lower side/back pain. Patient denies any LOC, head injury/trauma, neck pain, CP, SOB, abd pain, fever, chills, numbness, weakness, n/v/d, and all other sxs at this time. Prior tx includes pain meds en route. In the ED, creatinine 1.6 otherwise labs unremarkable. CT spine revealed severely comminuted fracture involving the L1 vertebral body with associated decreased vertebral body height.  Large retropulsed fracture fragment causing moderate spinal stenosis.  There is also a vertical component extending through the lamina of L1 on the left. Extensive right nephrolithiasis. 8 mm stone involving the left renal pelvis. No definite hydronephrosis. Dr. Orlando was consulted in the ED, plans for surgery tomorrow. Hospital medicine called for admission.     Overview/Hospital Course:  64 year old admitted for spinal fracture. CT showed L1 burst fx with 3 column injury. Neurosurgery consulted. MRI shows acute compression fracture of L1 with associated 30% loss of height.  Retropulsion of the dorsal cortex of L1 with moderate to severe central canal stenosis with probable distal cord/conus impingement.  Paraspinal soft tissue edema. As of 4/6 pt s/p spinal fusion per Dr. Orlando today. Pt tolerated procedure well.  Pain controlled on PCA pump.     Interval History: Pt eating dinner in bed. S/p spinal fusion per Dr. Orlando today. Pt tolerated procedure well. Pain controlled on PCA pump.       Review of Systems   Constitutional: Negative.  Negative for activity change, appetite change, chills, diaphoresis, fatigue and fever.   HENT: Negative.    Eyes: Negative.    Respiratory: Negative.  Negative for cough and shortness of breath.    Cardiovascular: Negative.  Negative for chest pain, palpitations and leg swelling.   Gastrointestinal: Negative.    Endocrine: Negative.    Genitourinary: Negative.  Negative for dysuria, flank pain and urgency.   Musculoskeletal: Positive for back pain.   Skin: Negative.    Allergic/Immunologic: Negative.    Neurological: Negative.  Negative for dizziness, syncope, weakness, numbness and headaches.   Hematological: Negative.    Psychiatric/Behavioral: Negative.      Objective:     Vital Signs (Most Recent):  Temp: 97.8 °F (36.6 °C) (04/06/20 1704)  Pulse: 93 (04/06/20 1704)  Resp: 20 (04/06/20 1704)  BP: 138/73 (04/06/20 1704)  SpO2: 96 % (04/06/20 1704) Vital Signs (24h Range):  Temp:  [97.8 °F (36.6 °C)-99.8 °F (37.7 °C)] 97.8 °F (36.6 °C)  Pulse:  [74-93] 93  Resp:  [9-47] 20  SpO2:  [91 %-98 %] 96 %  BP: (103-154)/() 138/73     Weight: 123 kg (271 lb 1.6 oz)  Body mass index is 36.77 kg/m².    Intake/Output Summary (Last 24 hours) at 4/6/2020 1504  Last data filed at 4/6/2020 1617  Gross per 24 hour   Intake 3407 ml   Output 1800 ml   Net 1607 ml      Physical Exam   Constitutional: He is oriented to person, place, and time. He appears well-developed and well-nourished.   HENT:   Head: Normocephalic and atraumatic.   Eyes: Pupils are equal, round, and reactive to light. EOM are normal.   Neck: Normal range of motion. Neck supple.   Cardiovascular: Normal rate, regular rhythm, normal heart sounds, intact distal pulses and normal pulses.   Pulmonary/Chest: Effort normal and breath sounds  normal. No accessory muscle usage. No tachypnea. No respiratory distress.   Abdominal: Soft. Normal appearance and bowel sounds are normal. There is no tenderness.   Musculoskeletal:        Lumbar back: He exhibits decreased range of motion (secondary to pain ), tenderness and pain.        Arms:  Neurological: He is alert and oriented to person, place, and time. He has normal reflexes.   Skin: Skin is warm, dry and intact. Capillary refill takes less than 2 seconds.   Psychiatric: He has a normal mood and affect. His speech is normal and behavior is normal. Judgment and thought content normal. Cognition and memory are normal.   Nursing note and vitals reviewed.      Significant Labs:   BMP:   Recent Labs   Lab 04/06/20  0501   *  113*     143   K 4.4  4.4   *  111*   CO2 23  23   BUN 24*  24*   CREATININE 1.1  1.1   CALCIUM 8.7  8.7     CBC:   Recent Labs   Lab 04/05/20  1428 04/06/20  0501   WBC 9.67 10.34  10.34   HGB 13.5* 13.7*  13.7*   HCT 42.0 42.6  42.6    179  179     CMP:   Recent Labs   Lab 04/05/20  1428 04/06/20  0501    143  143   K 4.2 4.4  4.4   * 111*  111*   CO2 22* 23  23    113*  113*   BUN 20 24*  24*   CREATININE 1.6* 1.1  1.1   CALCIUM 8.6* 8.7  8.7   PROT 6.5  --    ALBUMIN 3.5  --    BILITOT 0.4  --    ALKPHOS 75  --    AST 29  --    ALT 35  --    ANIONGAP 10 9  9   EGFRNONAA 45* >60  >60       Significant Imaging:   Imaging Results          CT Lumbar Spine Without Contrast (Final result)  Result time 04/05/20 15:34:45    Final result by Rizwan Albarran MD (Timothy) (04/05/20 15:34:45)                 Impression:      Severely comminuted fracture involving the L1 vertebral body with associated decreased vertebral body height.  Large retropulsed fracture fragment causing moderate spinal stenosis.  There is also a vertical component extending through the lamina of L1 on the left.    Extensive right nephrolithiasis.  8 mm stone  involving the left renal pelvis.  No definite hydronephrosis.    All CT scans at this facility are performed  using dose modulation techniques as appropriate to performed exam including the following:  automated exposure control; adjustment of mA and/or kV according to the patients size (this includes techniques or standardized protocols for targeted exams where dose is matched to indication/reason for exam: i.e. extremities or head);  iterative reconstruction technique.      Electronically signed by: Rizwan Albarran MD  Date:    04/05/2020  Time:    15:34             Narrative:    EXAMINATION:  CT LUMBAR SPINE WITHOUT CONTRAST    CLINICAL HISTORY:  Spine fracture, traumatic, lumbar;Low back pain, risk factors (osteoporosis or chronic steroid use or elderly);    TECHNIQUE:  Standard noncontrast CT scan of the lumbar spine.    COMPARISON:  None    FINDINGS:  There is a comminuted fracture involving the L1 vertebral body associated with decreased vertebral body height.  There is a large retropulsed fracture fragment noted measuring 1.7 by 1.3 cm causing moderate spinal stenosis.  There is also a component of the fracture extending through the lamina on L1 left.    No other fractures identified.  No disc herniations.  There is evidence of facet arthropathy bilaterally at L4-5 and L5-S1.    There is evidence of nephrolithiasis involving the right kidney.  There is a 8 mm stone in the left renal pelvis.  No definite hydronephrosis.                                Assessment/Plan:      * Fall (on) (from) unspecified stairs and steps, initial encounter  CT scan revealed L1 burst fracture with retropulsion of fragment posteriorly   Neurosurgery consulted   Plan for surgery tomorrow  NPO after midnight   Neurovascular checks   Analgesics prn  4/6/20   S/p spinal fusion per Dr. Orlando today. Pt tolerated procedure well. Pain controlled on PCA pump.   PT/OT consult         GUSTABO (acute kidney injury)  Creatinine 1.6 (baseline  1.3)  IVFs   Daily BMP   4/6/20  -Resolved after IVFs       GERD (gastroesophageal reflux disease)  Continue Protonix       Hyperlipidemia  Continue statin       Hypertension  Continue amlodipine and BB  ACEi held due to GUSTABO   BP stable   4/6/20  Bp stable will resume ACEi in morning         VTE Risk Mitigation (From admission, onward)         Ordered     IP VTE HIGH RISK PATIENT  Once      04/06/20 1622     Place FRANNY hose  Until discontinued      04/06/20 1622     Place sequential compression device  Until discontinued      04/06/20 1622                      Lore Ozuna NP  Department of Hospital Medicine   [facility]

## 2020-04-07 PROBLEM — N17.9 AKI (ACUTE KIDNEY INJURY): Chronic | Status: RESOLVED | Noted: 2020-04-05 | Resolved: 2020-04-07

## 2020-04-07 LAB
ANION GAP SERPL CALC-SCNC: 6 MMOL/L (ref 8–16)
ANION GAP SERPL CALC-SCNC: 6 MMOL/L (ref 8–16)
BASOPHILS # BLD AUTO: 0.01 K/UL (ref 0–0.2)
BASOPHILS # BLD AUTO: 0.01 K/UL (ref 0–0.2)
BASOPHILS NFR BLD: 0.1 % (ref 0–1.9)
BASOPHILS NFR BLD: 0.1 % (ref 0–1.9)
BUN SERPL-MCNC: 21 MG/DL (ref 8–23)
BUN SERPL-MCNC: 21 MG/DL (ref 8–23)
CALCIUM SERPL-MCNC: 8 MG/DL (ref 8.7–10.5)
CALCIUM SERPL-MCNC: 8 MG/DL (ref 8.7–10.5)
CHLORIDE SERPL-SCNC: 109 MMOL/L (ref 95–110)
CHLORIDE SERPL-SCNC: 109 MMOL/L (ref 95–110)
CO2 SERPL-SCNC: 24 MMOL/L (ref 23–29)
CO2 SERPL-SCNC: 24 MMOL/L (ref 23–29)
CREAT SERPL-MCNC: 1 MG/DL (ref 0.5–1.4)
CREAT SERPL-MCNC: 1 MG/DL (ref 0.5–1.4)
DIFFERENTIAL METHOD: ABNORMAL
DIFFERENTIAL METHOD: ABNORMAL
EOSINOPHIL # BLD AUTO: 0 K/UL (ref 0–0.5)
EOSINOPHIL # BLD AUTO: 0 K/UL (ref 0–0.5)
EOSINOPHIL NFR BLD: 0 % (ref 0–8)
EOSINOPHIL NFR BLD: 0 % (ref 0–8)
ERYTHROCYTE [DISTWIDTH] IN BLOOD BY AUTOMATED COUNT: 13.1 % (ref 11.5–14.5)
ERYTHROCYTE [DISTWIDTH] IN BLOOD BY AUTOMATED COUNT: 13.1 % (ref 11.5–14.5)
EST. GFR  (AFRICAN AMERICAN): >60 ML/MIN/1.73 M^2
EST. GFR  (AFRICAN AMERICAN): >60 ML/MIN/1.73 M^2
EST. GFR  (NON AFRICAN AMERICAN): >60 ML/MIN/1.73 M^2
EST. GFR  (NON AFRICAN AMERICAN): >60 ML/MIN/1.73 M^2
GLUCOSE SERPL-MCNC: 123 MG/DL (ref 70–110)
GLUCOSE SERPL-MCNC: 123 MG/DL (ref 70–110)
HCT VFR BLD AUTO: 33 % (ref 40–54)
HCT VFR BLD AUTO: 33 % (ref 40–54)
HGB BLD-MCNC: 10.5 G/DL (ref 14–18)
HGB BLD-MCNC: 10.5 G/DL (ref 14–18)
IMM GRANULOCYTES # BLD AUTO: 0.06 K/UL (ref 0–0.04)
IMM GRANULOCYTES # BLD AUTO: 0.06 K/UL (ref 0–0.04)
IMM GRANULOCYTES NFR BLD AUTO: 0.5 % (ref 0–0.5)
IMM GRANULOCYTES NFR BLD AUTO: 0.5 % (ref 0–0.5)
LYMPHOCYTES # BLD AUTO: 0.7 K/UL (ref 1–4.8)
LYMPHOCYTES # BLD AUTO: 0.7 K/UL (ref 1–4.8)
LYMPHOCYTES NFR BLD: 6.2 % (ref 18–48)
LYMPHOCYTES NFR BLD: 6.2 % (ref 18–48)
MCH RBC QN AUTO: 32.3 PG (ref 27–31)
MCH RBC QN AUTO: 32.3 PG (ref 27–31)
MCHC RBC AUTO-ENTMCNC: 31.8 G/DL (ref 32–36)
MCHC RBC AUTO-ENTMCNC: 31.8 G/DL (ref 32–36)
MCV RBC AUTO: 102 FL (ref 82–98)
MCV RBC AUTO: 102 FL (ref 82–98)
MONOCYTES # BLD AUTO: 1 K/UL (ref 0.3–1)
MONOCYTES # BLD AUTO: 1 K/UL (ref 0.3–1)
MONOCYTES NFR BLD: 9.2 % (ref 4–15)
MONOCYTES NFR BLD: 9.2 % (ref 4–15)
NEUTROPHILS # BLD AUTO: 9.4 K/UL (ref 1.8–7.7)
NEUTROPHILS # BLD AUTO: 9.4 K/UL (ref 1.8–7.7)
NEUTROPHILS NFR BLD: 84 % (ref 38–73)
NEUTROPHILS NFR BLD: 84 % (ref 38–73)
NRBC BLD-RTO: 0 /100 WBC
NRBC BLD-RTO: 0 /100 WBC
PLATELET # BLD AUTO: 136 K/UL (ref 150–350)
PLATELET # BLD AUTO: 136 K/UL (ref 150–350)
PMV BLD AUTO: 10.5 FL (ref 9.2–12.9)
PMV BLD AUTO: 10.5 FL (ref 9.2–12.9)
POTASSIUM SERPL-SCNC: 4.1 MMOL/L (ref 3.5–5.1)
POTASSIUM SERPL-SCNC: 4.1 MMOL/L (ref 3.5–5.1)
RBC # BLD AUTO: 3.25 M/UL (ref 4.6–6.2)
RBC # BLD AUTO: 3.25 M/UL (ref 4.6–6.2)
SODIUM SERPL-SCNC: 139 MMOL/L (ref 136–145)
SODIUM SERPL-SCNC: 139 MMOL/L (ref 136–145)
WBC # BLD AUTO: 11.21 K/UL (ref 3.9–12.7)
WBC # BLD AUTO: 11.21 K/UL (ref 3.9–12.7)

## 2020-04-07 PROCEDURE — 63600175 PHARM REV CODE 636 W HCPCS: Performed by: PHYSICIAN ASSISTANT

## 2020-04-07 PROCEDURE — 97162 PT EVAL MOD COMPLEX 30 MIN: CPT

## 2020-04-07 PROCEDURE — 25000003 PHARM REV CODE 250: Performed by: PHYSICIAN ASSISTANT

## 2020-04-07 PROCEDURE — 94799 UNLISTED PULMONARY SVC/PX: CPT

## 2020-04-07 PROCEDURE — 25000003 PHARM REV CODE 250: Performed by: NURSE PRACTITIONER

## 2020-04-07 PROCEDURE — U0002 COVID-19 LAB TEST NON-CDC: HCPCS

## 2020-04-07 PROCEDURE — 97535 SELF CARE MNGMENT TRAINING: CPT

## 2020-04-07 PROCEDURE — 85025 COMPLETE CBC W/AUTO DIFF WBC: CPT

## 2020-04-07 PROCEDURE — 94760 N-INVAS EAR/PLS OXIMETRY 1: CPT

## 2020-04-07 PROCEDURE — 11000001 HC ACUTE MED/SURG PRIVATE ROOM

## 2020-04-07 PROCEDURE — 27000221 HC OXYGEN, UP TO 24 HOURS

## 2020-04-07 PROCEDURE — 97165 OT EVAL LOW COMPLEX 30 MIN: CPT

## 2020-04-07 PROCEDURE — 97116 GAIT TRAINING THERAPY: CPT

## 2020-04-07 PROCEDURE — 80048 BASIC METABOLIC PNL TOTAL CA: CPT

## 2020-04-07 PROCEDURE — 36415 COLL VENOUS BLD VENIPUNCTURE: CPT

## 2020-04-07 RX ORDER — HYDROMORPHONE HYDROCHLORIDE 1 MG/ML
1 INJECTION, SOLUTION INTRAMUSCULAR; INTRAVENOUS; SUBCUTANEOUS
Status: DISCONTINUED | OUTPATIENT
Start: 2020-04-07 | End: 2020-04-09 | Stop reason: HOSPADM

## 2020-04-07 RX ORDER — OXYCODONE AND ACETAMINOPHEN 10; 325 MG/1; MG/1
1 TABLET ORAL EVERY 4 HOURS PRN
Status: DISCONTINUED | OUTPATIENT
Start: 2020-04-07 | End: 2020-04-09 | Stop reason: HOSPADM

## 2020-04-07 RX ORDER — OXYCODONE AND ACETAMINOPHEN 5; 325 MG/1; MG/1
1 TABLET ORAL EVERY 4 HOURS PRN
Status: DISCONTINUED | OUTPATIENT
Start: 2020-04-07 | End: 2020-04-09 | Stop reason: HOSPADM

## 2020-04-07 RX ORDER — DIAZEPAM 5 MG/1
5 TABLET ORAL EVERY 6 HOURS PRN
Status: DISCONTINUED | OUTPATIENT
Start: 2020-04-07 | End: 2020-04-09 | Stop reason: HOSPADM

## 2020-04-07 RX ORDER — OXYCODONE AND ACETAMINOPHEN 7.5; 325 MG/1; MG/1
1 TABLET ORAL EVERY 4 HOURS PRN
Status: DISCONTINUED | OUTPATIENT
Start: 2020-04-07 | End: 2020-04-09 | Stop reason: HOSPADM

## 2020-04-07 RX ADMIN — OXYCODONE HYDROCHLORIDE AND ACETAMINOPHEN 1 TABLET: 10; 325 TABLET ORAL at 06:04

## 2020-04-07 RX ADMIN — OXYCODONE HYDROCHLORIDE AND ACETAMINOPHEN 1 TABLET: 10; 325 TABLET ORAL at 10:04

## 2020-04-07 RX ADMIN — NEBIVOLOL HYDROCHLORIDE 10 MG: 10 TABLET ORAL at 08:04

## 2020-04-07 RX ADMIN — ATORVASTATIN CALCIUM 20 MG: 10 TABLET, FILM COATED ORAL at 09:04

## 2020-04-07 RX ADMIN — SENNOSIDES AND DOCUSATE SODIUM 2 TABLET: 8.6; 5 TABLET ORAL at 09:04

## 2020-04-07 RX ADMIN — VANCOMYCIN HYDROCHLORIDE 1500 MG: 1 INJECTION, POWDER, LYOPHILIZED, FOR SOLUTION INTRAVENOUS at 10:04

## 2020-04-07 RX ADMIN — OXYCODONE HYDROCHLORIDE AND ACETAMINOPHEN 1 TABLET: 10; 325 TABLET ORAL at 02:04

## 2020-04-07 RX ADMIN — PANTOPRAZOLE SODIUM 40 MG: 40 TABLET, DELAYED RELEASE ORAL at 08:04

## 2020-04-07 RX ADMIN — METHOCARBAMOL TABLETS 750 MG: 750 TABLET, COATED ORAL at 09:04

## 2020-04-07 RX ADMIN — METHOCARBAMOL TABLETS 750 MG: 750 TABLET, COATED ORAL at 08:04

## 2020-04-07 RX ADMIN — AMLODIPINE BESYLATE 10 MG: 10 TABLET ORAL at 08:04

## 2020-04-07 RX ADMIN — METHOCARBAMOL TABLETS 750 MG: 750 TABLET, COATED ORAL at 02:04

## 2020-04-07 NOTE — PLAN OF CARE
Pt received back brace today. Pt pain is controlled with po pain meds.  Pt ambulated with physical therapy today. Hemovac in place. Will cont to monitor.

## 2020-04-07 NOTE — ASSESSMENT & PLAN NOTE
Continue amlodipine and BB  ACEi held due to GUSTABO   BP stable   4/6/20  Bp stable will resume ACEi in morning   4/7/20  Bp stable

## 2020-04-07 NOTE — PT/OT/SLP EVAL
Occupational Therapy   Evaluation    Name: Eliud Frankel  MRN: [id number]  Admitting Diagnosis:  Fall (on) (from) unspecified stairs and steps, initial encounter 1 Day Post-Op    Recommendations:     Discharge Recommendations: home health OT, outpatient OT(hh ot vs out-pt)  Discharge Equipment Recommendations:  none  Barriers to discharge:       Assessment:     Eliud Frankel is a 64 y.o. male with a medical diagnosis of Fall (on) (from) unspecified stairs and steps, initial encounter.  He presents with debility and generalized weakness. Performance deficits affecting function: weakness, impaired self care skills, impaired balance, decreased safety awareness, impaired endurance, impaired functional mobilty, gait instability.      Rehab Prognosis: Good; patient would benefit from acute skilled OT services to address these deficits and reach maximum level of function.       Plan:     Patient to be seen 3 x/week to address the above listed problems via self-care/home management, therapeutic exercises, therapeutic activities  · Plan of Care Expires: 04/14/20  · Plan of Care Reviewed with: patient    Subjective     Chief Complaint: debility and generalized weakness  Patient/Family Comments/goals:     Occupational Profile:  Living Environment: lives with girlfriend in 1 story house and no steps  Previous level of function: (I) with adl's  Roles and Routines: occupational therapy  Equipment Used at Home:  none  Assistance upon Discharge:     Pain/Comfort:  · Pain Rating 1: 6/10  · Location - Orientation 1: generalized  · Location 1: back    Patients cultural, spiritual, Taoist conflicts given the current situation:      Objective:     Communicated with: lives  prior to session.  Patient found up in chair with peripheral IV upon OT entry to room.    General Precautions: Standard, fall   Orthopedic Precautions:N/A   Braces: TLSO     Occupational Performance:    Bed Mobility:    · Patient completed Rolling/Turning to  Left with  minimum assistance  · Patient completed Scooting/Bridging with minimum assistance  · Patient completed Supine to Sit with minimum assistance    Functional Mobility/Transfers:  · Patient completed Sit <> Stand Transfer with minimum assistance  with  rolling walker   · Patient completed Bed <> Chair Transfer using Step Transfer technique with minimum assistance with rolling walker  · Patient completed Toilet Transfer Step Transfer technique with minimum assistance with  grab bars  Functional Mobility:  Pt ambulated 16 feet x 1 seated rest break  Activities of Daily Living:  · Upper Body Dressing: minimum assistance .  · Toileting: minimum assistance .    Cognitive/Visual Perceptual:  Cognitive/Psychosocial Skills:  -       Oriented to: Person, Place, Time and Situation   -       Follows Commands/attention:Follows multistep  commands  -       Communication: clear/fluent  -       Memory: No Deficits noted  -       Safety awareness/insight to disability: impaired and max verbal cues tfor proper use of rolling walker   Visual/Perceptual:      -Intact .    Physical Exam:  Upper Extremity Range of Motion:     -       Right Upper Extremity: WFL  -       Left Upper Extremity: WFL  Upper Extremity Strength:    -       Right Upper Extremity: mmt: 3/5 grossly  -       Left Upper Extremity: mmt: 3/5 grossly    AMPAC 6 Click ADL:  AMPAC Total Score: 20    Treatment & Education:    Education:    Patient left up in chair with all lines intact, call button in reach, chair alarm on, nurse and cna notified and cna present    GOALS:   Multidisciplinary Problems     Occupational Therapy Goals        Problem: Occupational Therapy Goal    Goal Priority Disciplines Outcome Interventions   Occupational Therapy Goal     OT, PT/OT     Description:  OT GOALS TO BE MET BY 4-14-20  SBA WITH UE DRESSING  PT WILL TOLERATE 1 SET X 15 REPS B UE ROM EXERCISE  SBA WITH LE DRESSING                    History:     Past Medical History:    Diagnosis Date    GERD (gastroesophageal reflux disease)     Hyperlipidemia     Hypertension        Past Surgical History:   Procedure Laterality Date    FUSION OF POSTERIOR COLUMN OF LUMBAR SPINE USING COMPUTER-ASSISTED NAVIGATION Bilateral 4/6/2020    Procedure: FUSION, SPINE, POSTERIOR SPINAL COLUMN, LUMBAR, USING COMPUTER-ASSISTED NAVIGATION  ;  Surgeon: Donaldo Orlando MD;  Location: [facility];  Service: Neurosurgery;  Laterality: Bilateral;  T11-L3 for L1 burst fracture pedicle screw fusion        Time Tracking:     OT Date of Treatment: 04/07/20  OT Start Time: 1100  OT Stop Time: 1125  OT Total Time (min): 25 min    Billable Minutes:Evaluation 10 minutes  Self Care/Home Management 15 minutes    Charmaine Alcantar OT  4/7/2020

## 2020-04-07 NOTE — ASSESSMENT & PLAN NOTE
CT scan revealed L1 burst fracture with retropulsion of fragment posteriorly   Neurosurgery consulted   Plan for surgery tomorrow  NPO after midnight   Neurovascular checks   Analgesics prn  4/6/20   S/p spinal fusion per Dr. Orlando today. Pt tolerated procedure well. Pain controlled on PCA pump.   PT/OT consult   4/7/20  POD #1, patient lying in bed.   Pain controlled on PCA will transition to po pain meds.   PT/OT consult pending, further recommendations to follow.

## 2020-04-07 NOTE — NURSING TRANSFER
Back brace delivered at this time. Notified ZEESHAN Johns that patient has back brace and can be seen now.

## 2020-04-07 NOTE — PHYSICIAN QUERY
"PT Name: Eliud Frankel  MR #: [id number]    Physician Query Form - Hematology Clarification      CDS/: Sarahi Mckeon RN               Contact information:[email or web address]    This form is a permanent document in the medical record.      Query Date: April 7, 2020    By submitting this query, we are merely seeking further clarification of documentation. Please utilize your independent clinical judgment when addressing the question(s) below.    The Medical record contains the following:   Indicators  Supporting Clinical Findings Location in Medical Record    "Anemia" documented     x H & H = H&H= 13.5/42.0  H&H= 13.7/42.6  H&H= 10.5/33 Labs 4/5  Labs 4/6  Labs 4/7   x BP =                     HR= /67  HR 72  BP: (103-154)/()  HR 74-93  BP: (100-154)/()  HR  Vital signs 4/5@1403  hospital medicine PN 4/6  Hospital Medicine PN 4/7   x "GI bleeding" documented      Acute bleeding (Non GI site)      Transfusion(s)      Treatment:     x Other:  Closed burst fracture of lumbar vertebra  T11-L3 pedicle screw fusion   Estimated Blood Loss (EBL): 600 mL OP note 4/6     Provider, please specify diagnosis or diagnoses associated with above clinical findings.    [ x ] Acute blood loss anemia expected post-operatively   [  ] Acute blood loss anemia       [  ] Other Hematological Diagnosis (please specify):     [  ] Clinically Undetermined       Please document in your progress notes daily for the duration of treatment, until resolved, and include in your discharge summary.                                                                                                      "

## 2020-04-07 NOTE — PLAN OF CARE
Contacted patient's spouse Elizabeth, discussed therapy recommendations for home health upon discharge. Reviewed options for receiving home health. Preference letter obtained.Referral faxed via Telepathy to [facility].       04/07/20 1600   Post-Acute Status   Post-Acute Authorization Home Health   Home Health Status Referrals Sent   Discharge Delays (!) Other

## 2020-04-07 NOTE — PT/OT/SLP EVAL
Physical Therapy Evaluation    Patient Name:  Eliud Frankel   MRN:  [id number]    Recommendations:     Discharge Recommendations:  home health PT, outpatient PT   Discharge Equipment Recommendations: none   Barriers to discharge: None    Assessment:     Eliud Frankel is a 64 y.o. male admitted with a medical diagnosis of Fall (on) (from) unspecified stairs and steps, initial encounter.  He presents with the following impairments/functional limitations:  weakness, impaired functional mobilty, impaired endurance, gait instability, impaired balance, decreased coordination, decreased safety awareness.    Rehab Prognosis: Good; patient would benefit from acute skilled PT services to address these deficits and reach maximum level of function.    Recent Surgery: Procedure(s) (LRB):  FUSION, SPINE, POSTERIOR SPINAL COLUMN, LUMBAR, USING COMPUTER-ASSISTED NAVIGATION   (Bilateral) 1 Day Post-Op    Plan:     During this hospitalization, patient to be seen 5 x/week to address the identified rehab impairments via gait training, therapeutic activities, therapeutic exercises and progress toward the following goals:    · Plan of Care Expires:  04/14/20    Subjective     Chief Complaint:   Patient/Family Comments/goals:   Pain/Comfort:  · Pain Rating 1: 6/10  · Location 1: back  · Pain Rating 2: 5/10  · Location - Side 2: Left  · Location - Orientation 2: generalized  · Location 2: leg(PAIN IN LLE WITH WBING)    Patients cultural, spiritual, Zoroastrian conflicts given the current situation:      Living Environment:  PT LIVES WITH WIFE WHO IS ABLE TO ASSIST AS NEEDED, 1 STORY HOUSE 3 STEPS NO RAIL,  AMB INDEP COMMUNITY DISTANCES PTA, STILL DRIVES AND WORKS, INDEP WITH ADL'S  Prior to admission, patients level of function was INDEP.  Equipment used at home: none.  DME owned (not currently used): rolling walker, single point cane and wheelchair.  Upon discharge, patient will have assistance from WIFE.    Objective:      Communicated with NURSE HACKETT prior to session.  Patient found supine with peripheral IV  upon PT entry to room.    General Precautions: Standard, fall   Orthopedic Precautions:N/A   Braces: TLSO     Exams:  · Cognitive Exam:  Patient is oriented to Person, Place, Time and Situation  · Postural Exam:  Patient presented with the following abnormalities:    · -       Rounded shoulders  · Sensation:    · -       Intact  · RLE ROM: WFL  · RLE Strength: WFL  · LLE ROM: WFL  · LLE Strength: WFL    Functional Mobility:  · Bed Mobility:     · Rolling Left:  minimum assistance  · Scooting: minimum assistance  · Supine to Sit: minimum assistance  · Transfers:     · Sit to Stand:  minimum assistance with hand-held assist  · Bed to Chair: minimum assistance with  hand-held assist  using  Step Transfer  · Gait: PT AMB 8' TO AND FROM BATHROOM, ATTEMPTED RW USE FOR SAFETY BUT PT IN TOO MUCH OF A RUSH TO USE, AMADOR , NO LOB OR SOB ON ROOM AIR, LIMITED BY PAIN BUT GOOD EFFORT, SLIGHT BUCKLE AT LLE DUE TO PAIN WITH WB BUT NO LOB, QUICK PACE  · Balance: POOR+    Therapeutic Activities and Exercises:   PT EDUCATED IN ROLE OF P.T. AND POC, PT EDUCATED IN LOG ROLLING FOR BED MOBILITY, PT EDUCATED IN RW USE AND SAFETY DURING TF'S AND GAIT, PT EDUCATED IN DONNING/DOFFING TLSO BRACE, PT REQUIRED AMADOR TO COMPLETE DAI/DOFF OF BRACE, AMADOR FOR TOILET TF IN BATHROOM, PT EDUCATED IN BLE THEREX TO PERFORM WHILE SEATED IN CHAIR    AM-PAC 6 CLICK MOBILITY  Total Score:16     Patient left up in chair with all lines intact, call button in reach, chair alarm on and NURSE notified.    GOALS:   Multidisciplinary Problems     Physical Therapy Goals        Problem: Physical Therapy Goal    Goal Priority Disciplines Outcome Goal Variances Interventions   Physical Therapy Goal     PT, PT/OT      Description:  LTG'S TO BE MET IN 7 DAYS (4-14-20)  1. PT WILL REQUIRE SPV FOR BED MOBILITY  2. PT WILL REQUIRE SPV FOR TF'S  3. PT WILL ' WITH OR WITHOUT  RW AND SPV                    History:     Past Medical History:   Diagnosis Date    GERD (gastroesophageal reflux disease)     Hyperlipidemia     Hypertension        Past Surgical History:   Procedure Laterality Date    FUSION OF POSTERIOR COLUMN OF LUMBAR SPINE USING COMPUTER-ASSISTED NAVIGATION Bilateral 4/6/2020    Procedure: FUSION, SPINE, POSTERIOR SPINAL COLUMN, LUMBAR, USING COMPUTER-ASSISTED NAVIGATION  ;  Surgeon: Donaldo Orlando MD;  Location: [facility];  Service: Neurosurgery;  Laterality: Bilateral;  T11-L3 for L1 burst fracture pedicle screw fusion        Time Tracking:     PT Received On: 04/07/20  PT Start Time: 1035     PT Stop Time: 1100  PT Total Time (min): 25 min     Billable Minutes: Evaluation 15 and Gait Training 10    Nat Nguyen, PT  04/07/2020

## 2020-04-07 NOTE — PROGRESS NOTES
[facility] Hospital Medicine  Progress Note    Patient Name: Eliud Frankel  MRN: [id number]  Patient Class: IP- Inpatient   Admission Date: 4/5/2020  Length of Stay: 2 days  Attending Physician: Alessandra Mccracken MD  Primary Care Provider: Primary Doctor No        Subjective:     Principal Problem:Fall (on) (from) unspecified stairs and steps, initial encounter        HPI:  Eliud Frankel is a 64 y.o. male patient with a PMHx of GERD, HLD, and HTN who presents to the Emergency Department for evaluation of fall from ladder of approximately 8 ft which onset suddenly just PTA. Symptoms are constant and moderate in severity. No mitigating or exacerbating factors reported. Associated sxs include right lower side/back pain. Patient denies any LOC, head injury/trauma, neck pain, CP, SOB, abd pain, fever, chills, numbness, weakness, n/v/d, and all other sxs at this time. Prior tx includes pain meds en route. In the ED, creatinine 1.6 otherwise labs unremarkable. CT spine revealed severely comminuted fracture involving the L1 vertebral body with associated decreased vertebral body height.  Large retropulsed fracture fragment causing moderate spinal stenosis.  There is also a vertical component extending through the lamina of L1 on the left. Extensive right nephrolithiasis. 8 mm stone involving the left renal pelvis. No definite hydronephrosis. Dr. Orlando was consulted in the ED, plans for surgery tomorrow. Hospital medicine called for admission.     Overview/Hospital Course:  64 year old admitted for spinal fracture. CT showed L1 burst fx with 3 column injury. Neurosurgery consulted. MRI shows acute compression fracture of L1 with associated 30% loss of height.  Retropulsion of the dorsal cortex of L1 with moderate to severe central canal stenosis with probable distal cord/conus impingement.  Paraspinal soft tissue edema. As of 4/6 pt s/p spinal fusion per Dr. Orlando today. Pt tolerated procedure well. Pain  controlled on PCA pump. As of 4/7 POD #1, patient lying in bed. Pain controlled on PCA will transition to po pain meds. Reports back pain with movement only. PT/OT consult pending, further recommendations to follow. TLSO brace ordered.        Interval History: No acute events overnight. POD #1, patient lying in bed. Pain controlled on PCA will transition to po pain meds. Reports back pain with movement only. PT/OT consult pending, further recommendations to follow. TLSO brace ordered.        Review of Systems   Constitutional: Negative.  Negative for activity change, appetite change, chills, diaphoresis, fatigue and fever.   HENT: Negative.    Eyes: Negative.    Respiratory: Negative.  Negative for cough and shortness of breath.    Cardiovascular: Negative.  Negative for chest pain, palpitations and leg swelling.   Gastrointestinal: Negative.    Endocrine: Negative.    Genitourinary: Negative.  Negative for dysuria, flank pain and urgency.   Musculoskeletal: Positive for back pain.   Skin: Negative.    Allergic/Immunologic: Negative.    Neurological: Negative.  Negative for dizziness, syncope, weakness, numbness and headaches.   Hematological: Negative.    Psychiatric/Behavioral: Negative.      Objective:     Vital Signs (Most Recent):  Temp: 98 °F (36.7 °C) (04/07/20 1227)  Pulse: 103 (04/07/20 1227)  Resp: 18 (04/07/20 1227)  BP: (!) 100/59 (04/07/20 1227)  SpO2: (!) 93 % (04/07/20 1227) Vital Signs (24h Range):  Temp:  [97.6 °F (36.4 °C)-99.8 °F (37.7 °C)] 98 °F (36.7 °C)  Pulse:  [] 103  Resp:  [9-47] 18  SpO2:  [91 %-98 %] 93 %  BP: (100-154)/() 100/59     Weight: 127.1 kg (280 lb 3.3 oz)  Body mass index is 38 kg/m².    Intake/Output Summary (Last 24 hours) at 4/7/2020 1300  Last data filed at 4/7/2020 0959  Gross per 24 hour   Intake 4604.33 ml   Output 2025 ml   Net 2579.33 ml      Physical Exam   Constitutional: He is oriented to person, place, and time. He appears well-developed and  well-nourished.   HENT:   Head: Normocephalic and atraumatic.   Eyes: Pupils are equal, round, and reactive to light. EOM are normal.   Neck: Normal range of motion. Neck supple.   Cardiovascular: Normal rate, regular rhythm, normal heart sounds, intact distal pulses and normal pulses.   Pulmonary/Chest: Effort normal and breath sounds normal. No accessory muscle usage. No tachypnea. No respiratory distress.   Abdominal: Soft. Normal appearance and bowel sounds are normal. There is no tenderness.   Musculoskeletal:        Lumbar back: He exhibits decreased range of motion (secondary to pain ), tenderness and pain.        Arms:  Sensation intact to light touch  Moves all 4 extremities   Neurological: He is alert and oriented to person, place, and time. He has normal reflexes.   Skin: Skin is warm, dry and intact. Capillary refill takes less than 2 seconds.   Psychiatric: He has a normal mood and affect. His speech is normal and behavior is normal. Judgment and thought content normal. Cognition and memory are normal.   Nursing note and vitals reviewed.      Significant Labs:   BMP:   Recent Labs   Lab 04/07/20  0412   *  123*     139   K 4.1  4.1     109   CO2 24  24   BUN 21  21   CREATININE 1.0  1.0   CALCIUM 8.0*  8.0*     CBC:   Recent Labs   Lab 04/05/20  1428 04/06/20  0501 04/07/20  0412   WBC 9.67 10.34  10.34 11.21  11.21   HGB 13.5* 13.7*  13.7* 10.5*  10.5*   HCT 42.0 42.6  42.6 33.0*  33.0*    179  179 136*  136*     CMP:   Recent Labs   Lab 04/05/20 1428 04/06/20  0501 04/07/20  0412    143  143 139  139   K 4.2 4.4  4.4 4.1  4.1   * 111*  111* 109  109   CO2 22* 23  23 24  24    113*  113* 123*  123*   BUN 20 24*  24* 21  21   CREATININE 1.6* 1.1  1.1 1.0  1.0   CALCIUM 8.6* 8.7  8.7 8.0*  8.0*   PROT 6.5  --   --    ALBUMIN 3.5  --   --    BILITOT 0.4  --   --    ALKPHOS 75  --   --    AST 29  --   --    ALT 35  --   --     ANIONGAP 10 9  9 6*  6*   EGFRNONAA 45* >60  >60 >60  >60       Significant Imaging: I have reviewed all pertinent imaging results/findings within the past 24 hours.      Assessment/Plan:      * Fall (on) (from) unspecified stairs and steps, initial encounter  CT scan revealed L1 burst fracture with retropulsion of fragment posteriorly   Neurosurgery consulted   Plan for surgery tomorrow  NPO after midnight   Neurovascular checks   Analgesics prn  4/6/20   S/p spinal fusion per Dr. Orlando today. Pt tolerated procedure well. Pain controlled on PCA pump.   PT/OT consult   4/7/20  POD #1, patient lying in bed.   Pain controlled on PCA will transition to po pain meds.   PT/OT consult pending, further recommendations to follow.                GERD (gastroesophageal reflux disease)  Continue Protonix       Hyperlipidemia  Continue statin       Hypertension  Continue amlodipine and BB  ACEi held due to GUSTABO   BP stable   4/6/20  Bp stable will resume ACEi in morning   4/7/20  Bp stable         VTE Risk Mitigation (From admission, onward)         Ordered     IP VTE HIGH RISK PATIENT  Once      04/06/20 1622     Place FRANNY hose  Until discontinued      04/06/20 1622     Place sequential compression device  Until discontinued      04/06/20 1622                      Lore Ozuna NP  Department of Hospital Medicine   [facility]

## 2020-04-07 NOTE — PROGRESS NOTES
[facility]   Neurosurgery  Progress Note    Subjective:   History of Present Illness: see prior notes.    POD 1  Patient lying in bed   Pain controlled with PCA  C/o back pain, which is expected given his surgery, only with movement  Denies leg pain, arm pain, numbness/tingling  Denies HA, dizziness, shortness of breath and chest pain  Mckinley removed this morning  Hemovac drain output 450 mL  Afebrile    Post-Op Info:  Procedure(s) (LRB):  FUSION, SPINE, POSTERIOR SPINAL COLUMN, LUMBAR, USING COMPUTER-ASSISTED NAVIGATION   (Bilateral)   1 Day Post-Op      Medications:  Continuous Infusions:   sodium chloride 0.9% 100 mL/hr at 04/06/20 0734    hydromorphone in 0.9 % NaCl 6 mg/30 ml       Scheduled Meds:   amLODIPine  10 mg Oral Daily    atorvastatin  20 mg Oral QHS    methocarbamoL  750 mg Oral TID    nebivoloL  10 mg Oral Daily    nozaseptin   Each Nostril BID    pantoprazole  40 mg Oral Daily    vancomycin (VANCOCIN) IVPB  1,500 mg Intravenous Q12H     PRN Meds:aluminum-magnesium hydroxide-simethicone, bisacodyL, naloxone, ondansetron, oxyCODONE-acetaminophen, promethazine (PHENERGAN) IVPB, senna-docusate 8.6-50 mg, sodium chloride 0.9%     Review of Systems  Objective:     Weight: 127.1 kg (280 lb 3.3 oz)  Body mass index is 38 kg/m².  Vital Signs (Most Recent):  Temp: 98.5 °F (36.9 °C) (04/07/20 0723)  Pulse: 91 (04/07/20 0723)  Resp: 17 (04/07/20 0723)  BP: 117/65 (04/07/20 0723)  SpO2: 98 % (04/07/20 0758) Vital Signs (24h Range):  Temp:  [97.6 °F (36.4 °C)-99.8 °F (37.7 °C)] 98.5 °F (36.9 °C)  Pulse:  [] 91  Resp:  [9-47] 17  SpO2:  [91 %-98 %] 98 %  BP: (107-154)/() 117/65     Date 04/07/20 0700 - 04/08/20 0659   Shift 2737-2587 9944-7835 3750-4632 24 Hour Total   INTAKE   I.V.(mL/kg) 2(0)   2(0)   Shift Total(mL/kg) 2(0)   2(0)   OUTPUT   Shift Total(mL/kg)       Weight (kg) 127.1 127.1 127.1 127.1              Oxygen Concentration (%):  [24-98] 24         Closed/Suction  Drain 04/06/20 1348 Right Back Accordion 10 Fr. (Active)   Site Description Healing 4/7/2020  3:28 AM   Dressing Type Transparent (Tegaderm) 4/7/2020  7:23 AM   Dressing Status Clean;Dry;Intact 4/7/2020  7:23 AM   Output (mL) 100 mL 4/7/2020  6:43 AM       Neurosurgery Physical Exam  Vitals reviewed  Tolerating diet  Sensation intact to light touch  Moves all 4 extremities  Incision- covered with Aquacel surgical dressing  CDI  No erythema or fluctuance  Hemovac drain in place      Significant Labs:  Recent Labs   Lab 04/05/20  1428 04/06/20  0501 04/07/20  0412    113*  113* 123*  123*    143  143 139  139   K 4.2 4.4  4.4 4.1  4.1   * 111*  111* 109  109   CO2 22* 23  23 24  24   BUN 20 24*  24* 21  21   CREATININE 1.6* 1.1  1.1 1.0  1.0   CALCIUM 8.6* 8.7  8.7 8.0*  8.0*     Recent Labs   Lab 04/05/20  1428 04/06/20  0501 04/07/20  0412   WBC 9.67 10.34  10.34 11.21  11.21   HGB 13.5* 13.7*  13.7* 10.5*  10.5*   HCT 42.0 42.6  42.6 33.0*  33.0*    179  179 136*  136*     Recent Labs   Lab 04/05/20  1428   INR 1.0   APTT SEE COMMENT     Microbiology Results (last 7 days)     ** No results found for the last 168 hours. **        All pertinent labs from the last 24 hours have been reviewed.  Significant Diagnostics:  I have reviewed all pertinent imaging results/findings within the past 24 hours.    Assessment/Plan:     Active Diagnoses:    Diagnosis Date Noted POA    PRINCIPAL PROBLEM:  Fall (on) (from) unspecified stairs and steps, initial encounter [W10.9XXA] 04/05/2020 Yes     Chronic    Hypertension [I10] 04/05/2020 Yes    Hyperlipidemia [E78.5] 04/05/2020 Yes    GERD (gastroesophageal reflux disease) [K21.9] 04/05/2020 Yes    GUSTABO (acute kidney injury) [N17.9] 04/05/2020 Yes     Chronic      Problems Resolved During this Admission:     Patient is doing good given his surgery.  Will discontinue PCA this AM and transition to PO pain medications and IV  breakthrough pain relief.  TLSO ordered, contacted CORTEZ Morrell and she will deliver to bedside this AM.  Work with PT/OT  Ambulate several times daily with assistance  Discontinue hemovac drain in the AM- per MD Orlando  Will continue to monitor      Rome Mays PA-C  Neurosurgery  [facility]

## 2020-04-07 NOTE — SUBJECTIVE & OBJECTIVE
Interval History: No acute events overnight. POD #1, patient lying in bed. Pain controlled on PCA will transition to po pain meds. Reports back pain with movement only. PT/OT consult pending, further recommendations to follow. TLSO brace ordered.        Review of Systems   Constitutional: Negative.  Negative for activity change, appetite change, chills, diaphoresis, fatigue and fever.   HENT: Negative.    Eyes: Negative.    Respiratory: Negative.  Negative for cough and shortness of breath.    Cardiovascular: Negative.  Negative for chest pain, palpitations and leg swelling.   Gastrointestinal: Negative.    Endocrine: Negative.    Genitourinary: Negative.  Negative for dysuria, flank pain and urgency.   Musculoskeletal: Positive for back pain.   Skin: Negative.    Allergic/Immunologic: Negative.    Neurological: Negative.  Negative for dizziness, syncope, weakness, numbness and headaches.   Hematological: Negative.    Psychiatric/Behavioral: Negative.      Objective:     Vital Signs (Most Recent):  Temp: 98 °F (36.7 °C) (04/07/20 1227)  Pulse: 103 (04/07/20 1227)  Resp: 18 (04/07/20 1227)  BP: (!) 100/59 (04/07/20 1227)  SpO2: (!) 93 % (04/07/20 1227) Vital Signs (24h Range):  Temp:  [97.6 °F (36.4 °C)-99.8 °F (37.7 °C)] 98 °F (36.7 °C)  Pulse:  [] 103  Resp:  [9-47] 18  SpO2:  [91 %-98 %] 93 %  BP: (100-154)/() 100/59     Weight: 127.1 kg (280 lb 3.3 oz)  Body mass index is 38 kg/m².    Intake/Output Summary (Last 24 hours) at 4/7/2020 1300  Last data filed at 4/7/2020 0959  Gross per 24 hour   Intake 4604.33 ml   Output 2025 ml   Net 2579.33 ml      Physical Exam   Constitutional: He is oriented to person, place, and time. He appears well-developed and well-nourished.   HENT:   Head: Normocephalic and atraumatic.   Eyes: Pupils are equal, round, and reactive to light. EOM are normal.   Neck: Normal range of motion. Neck supple.   Cardiovascular: Normal rate, regular rhythm, normal heart sounds, intact  distal pulses and normal pulses.   Pulmonary/Chest: Effort normal and breath sounds normal. No accessory muscle usage. No tachypnea. No respiratory distress.   Abdominal: Soft. Normal appearance and bowel sounds are normal. There is no tenderness.   Musculoskeletal:        Lumbar back: He exhibits decreased range of motion (secondary to pain ), tenderness and pain.        Arms:  Sensation intact to light touch  Moves all 4 extremities   Neurological: He is alert and oriented to person, place, and time. He has normal reflexes.   Skin: Skin is warm, dry and intact. Capillary refill takes less than 2 seconds.   Psychiatric: He has a normal mood and affect. His speech is normal and behavior is normal. Judgment and thought content normal. Cognition and memory are normal.   Nursing note and vitals reviewed.      Significant Labs:   BMP:   Recent Labs   Lab 04/07/20  0412   *  123*     139   K 4.1  4.1     109   CO2 24  24   BUN 21  21   CREATININE 1.0  1.0   CALCIUM 8.0*  8.0*     CBC:   Recent Labs   Lab 04/05/20  1428 04/06/20  0501 04/07/20  0412   WBC 9.67 10.34  10.34 11.21  11.21   HGB 13.5* 13.7*  13.7* 10.5*  10.5*   HCT 42.0 42.6  42.6 33.0*  33.0*    179  179 136*  136*     CMP:   Recent Labs   Lab 04/05/20  1428 04/06/20  0501 04/07/20  0412    143  143 139  139   K 4.2 4.4  4.4 4.1  4.1   * 111*  111* 109  109   CO2 22* 23  23 24  24    113*  113* 123*  123*   BUN 20 24*  24* 21  21   CREATININE 1.6* 1.1  1.1 1.0  1.0   CALCIUM 8.6* 8.7  8.7 8.0*  8.0*   PROT 6.5  --   --    ALBUMIN 3.5  --   --    BILITOT 0.4  --   --    ALKPHOS 75  --   --    AST 29  --   --    ALT 35  --   --    ANIONGAP 10 9  9 6*  6*   EGFRNONAA 45* >60  >60 >60  >60       Significant Imaging: I have reviewed all pertinent imaging results/findings within the past 24 hours.

## 2020-04-07 NOTE — PLAN OF CARE
No acute adverse events noted. IV fluids, ABX administered as ordered. Pain adequately controlled with PCA pump. Neuro check q4hr. Mckinley catheter removed as ordered, tip intact, pt tolerated well. HVAC in place, 300ml of bloody drainage noted.  Afebrile. Awaiting TLSO brace. Dressing CDI. Chart reviewed.

## 2020-04-08 PROBLEM — J98.11 ATELECTASIS: Status: ACTIVE | Noted: 2020-04-08

## 2020-04-08 PROBLEM — K56.7 ILEUS: Status: ACTIVE | Noted: 2020-04-08

## 2020-04-08 LAB
ANION GAP SERPL CALC-SCNC: 8 MMOL/L (ref 8–16)
ANION GAP SERPL CALC-SCNC: 8 MMOL/L (ref 8–16)
BASOPHILS # BLD AUTO: 0.01 K/UL (ref 0–0.2)
BASOPHILS # BLD AUTO: 0.01 K/UL (ref 0–0.2)
BASOPHILS NFR BLD: 0.1 % (ref 0–1.9)
BASOPHILS NFR BLD: 0.1 % (ref 0–1.9)
BUN SERPL-MCNC: 18 MG/DL (ref 8–23)
BUN SERPL-MCNC: 18 MG/DL (ref 8–23)
CALCIUM SERPL-MCNC: 8 MG/DL (ref 8.7–10.5)
CALCIUM SERPL-MCNC: 8 MG/DL (ref 8.7–10.5)
CHLORIDE SERPL-SCNC: 109 MMOL/L (ref 95–110)
CHLORIDE SERPL-SCNC: 109 MMOL/L (ref 95–110)
CO2 SERPL-SCNC: 23 MMOL/L (ref 23–29)
CO2 SERPL-SCNC: 23 MMOL/L (ref 23–29)
CREAT SERPL-MCNC: 0.9 MG/DL (ref 0.5–1.4)
CREAT SERPL-MCNC: 0.9 MG/DL (ref 0.5–1.4)
DIFFERENTIAL METHOD: ABNORMAL
DIFFERENTIAL METHOD: ABNORMAL
EOSINOPHIL # BLD AUTO: 0 K/UL (ref 0–0.5)
EOSINOPHIL # BLD AUTO: 0 K/UL (ref 0–0.5)
EOSINOPHIL NFR BLD: 0.1 % (ref 0–8)
EOSINOPHIL NFR BLD: 0.1 % (ref 0–8)
ERYTHROCYTE [DISTWIDTH] IN BLOOD BY AUTOMATED COUNT: 13.1 % (ref 11.5–14.5)
ERYTHROCYTE [DISTWIDTH] IN BLOOD BY AUTOMATED COUNT: 13.1 % (ref 11.5–14.5)
EST. GFR  (AFRICAN AMERICAN): >60 ML/MIN/1.73 M^2
EST. GFR  (AFRICAN AMERICAN): >60 ML/MIN/1.73 M^2
EST. GFR  (NON AFRICAN AMERICAN): >60 ML/MIN/1.73 M^2
EST. GFR  (NON AFRICAN AMERICAN): >60 ML/MIN/1.73 M^2
GLUCOSE SERPL-MCNC: 107 MG/DL (ref 70–110)
GLUCOSE SERPL-MCNC: 107 MG/DL (ref 70–110)
HCT VFR BLD AUTO: 32 % (ref 40–54)
HCT VFR BLD AUTO: 32 % (ref 40–54)
HGB BLD-MCNC: 10.5 G/DL (ref 14–18)
HGB BLD-MCNC: 10.5 G/DL (ref 14–18)
IMM GRANULOCYTES # BLD AUTO: 0.08 K/UL (ref 0–0.04)
IMM GRANULOCYTES # BLD AUTO: 0.08 K/UL (ref 0–0.04)
IMM GRANULOCYTES NFR BLD AUTO: 0.7 % (ref 0–0.5)
IMM GRANULOCYTES NFR BLD AUTO: 0.7 % (ref 0–0.5)
LYMPHOCYTES # BLD AUTO: 0.8 K/UL (ref 1–4.8)
LYMPHOCYTES # BLD AUTO: 0.8 K/UL (ref 1–4.8)
LYMPHOCYTES NFR BLD: 7.2 % (ref 18–48)
LYMPHOCYTES NFR BLD: 7.2 % (ref 18–48)
MCH RBC QN AUTO: 32.7 PG (ref 27–31)
MCH RBC QN AUTO: 32.7 PG (ref 27–31)
MCHC RBC AUTO-ENTMCNC: 32.8 G/DL (ref 32–36)
MCHC RBC AUTO-ENTMCNC: 32.8 G/DL (ref 32–36)
MCV RBC AUTO: 100 FL (ref 82–98)
MCV RBC AUTO: 100 FL (ref 82–98)
MONOCYTES # BLD AUTO: 1 K/UL (ref 0.3–1)
MONOCYTES # BLD AUTO: 1 K/UL (ref 0.3–1)
MONOCYTES NFR BLD: 9.2 % (ref 4–15)
MONOCYTES NFR BLD: 9.2 % (ref 4–15)
NEUTROPHILS # BLD AUTO: 9 K/UL (ref 1.8–7.7)
NEUTROPHILS # BLD AUTO: 9 K/UL (ref 1.8–7.7)
NEUTROPHILS NFR BLD: 82.7 % (ref 38–73)
NEUTROPHILS NFR BLD: 82.7 % (ref 38–73)
NRBC BLD-RTO: 0 /100 WBC
NRBC BLD-RTO: 0 /100 WBC
PLATELET # BLD AUTO: 132 K/UL (ref 150–350)
PLATELET # BLD AUTO: 132 K/UL (ref 150–350)
PMV BLD AUTO: 10.7 FL (ref 9.2–12.9)
PMV BLD AUTO: 10.7 FL (ref 9.2–12.9)
POTASSIUM SERPL-SCNC: 3.9 MMOL/L (ref 3.5–5.1)
POTASSIUM SERPL-SCNC: 3.9 MMOL/L (ref 3.5–5.1)
PROCALCITONIN SERPL IA-MCNC: 0.19 NG/ML
RBC # BLD AUTO: 3.21 M/UL (ref 4.6–6.2)
RBC # BLD AUTO: 3.21 M/UL (ref 4.6–6.2)
SARS-COV-2 RNA RESP QL NAA+PROBE: NOT DETECTED
SODIUM SERPL-SCNC: 140 MMOL/L (ref 136–145)
SODIUM SERPL-SCNC: 140 MMOL/L (ref 136–145)
WBC # BLD AUTO: 10.92 K/UL (ref 3.9–12.7)
WBC # BLD AUTO: 10.92 K/UL (ref 3.9–12.7)

## 2020-04-08 PROCEDURE — 85025 COMPLETE CBC W/AUTO DIFF WBC: CPT

## 2020-04-08 PROCEDURE — 94760 N-INVAS EAR/PLS OXIMETRY 1: CPT

## 2020-04-08 PROCEDURE — 36415 COLL VENOUS BLD VENIPUNCTURE: CPT

## 2020-04-08 PROCEDURE — 84145 PROCALCITONIN (PCT): CPT

## 2020-04-08 PROCEDURE — 99900035 HC TECH TIME PER 15 MIN (STAT)

## 2020-04-08 PROCEDURE — 11000001 HC ACUTE MED/SURG PRIVATE ROOM

## 2020-04-08 PROCEDURE — 25000003 PHARM REV CODE 250: Performed by: PHYSICIAN ASSISTANT

## 2020-04-08 PROCEDURE — 63600175 PHARM REV CODE 636 W HCPCS: Performed by: NURSE PRACTITIONER

## 2020-04-08 PROCEDURE — 25000003 PHARM REV CODE 250: Performed by: NURSE PRACTITIONER

## 2020-04-08 PROCEDURE — 27000221 HC OXYGEN, UP TO 24 HOURS

## 2020-04-08 PROCEDURE — 63600175 PHARM REV CODE 636 W HCPCS: Performed by: PHYSICIAN ASSISTANT

## 2020-04-08 PROCEDURE — 80048 BASIC METABOLIC PNL TOTAL CA: CPT

## 2020-04-08 PROCEDURE — 97116 GAIT TRAINING THERAPY: CPT

## 2020-04-08 PROCEDURE — 94761 N-INVAS EAR/PLS OXIMETRY MLT: CPT

## 2020-04-08 PROCEDURE — 97530 THERAPEUTIC ACTIVITIES: CPT

## 2020-04-08 PROCEDURE — 94799 UNLISTED PULMONARY SVC/PX: CPT

## 2020-04-08 RX ORDER — ADHESIVE BANDAGE
30 BANDAGE TOPICAL DAILY PRN
Status: DISCONTINUED | OUTPATIENT
Start: 2020-04-08 | End: 2020-04-08

## 2020-04-08 RX ORDER — CYCLOBENZAPRINE HCL 10 MG
10 TABLET ORAL 3 TIMES DAILY PRN
Status: DISCONTINUED | OUTPATIENT
Start: 2020-04-08 | End: 2020-04-09 | Stop reason: HOSPADM

## 2020-04-08 RX ORDER — ADHESIVE BANDAGE
30 BANDAGE TOPICAL DAILY PRN
Status: DISCONTINUED | OUTPATIENT
Start: 2020-04-08 | End: 2020-04-09 | Stop reason: HOSPADM

## 2020-04-08 RX ORDER — SYRING-NEEDL,DISP,INSUL,0.3 ML 29 G X1/2"
296 SYRINGE, EMPTY DISPOSABLE MISCELLANEOUS NIGHTLY
Qty: 2072 ML | Refills: 0 | Status: SHIPPED | OUTPATIENT
Start: 2020-04-08 | End: 2020-04-09 | Stop reason: HOSPADM

## 2020-04-08 RX ORDER — HYDROMORPHONE HYDROCHLORIDE 1 MG/ML
1 INJECTION, SOLUTION INTRAMUSCULAR; INTRAVENOUS; SUBCUTANEOUS ONCE
Status: COMPLETED | OUTPATIENT
Start: 2020-04-08 | End: 2020-04-08

## 2020-04-08 RX ORDER — LEVOFLOXACIN 750 MG/1
750 TABLET ORAL DAILY
Status: DISCONTINUED | OUTPATIENT
Start: 2020-04-08 | End: 2020-04-09 | Stop reason: HOSPADM

## 2020-04-08 RX ADMIN — BISACODYL 5 MG: 5 TABLET, COATED ORAL at 06:04

## 2020-04-08 RX ADMIN — NEBIVOLOL HYDROCHLORIDE 10 MG: 10 TABLET ORAL at 08:04

## 2020-04-08 RX ADMIN — LEVOFLOXACIN 750 MG: 750 TABLET, FILM COATED ORAL at 01:04

## 2020-04-08 RX ADMIN — HYDROMORPHONE HYDROCHLORIDE 1 MG: 1 INJECTION, SOLUTION INTRAMUSCULAR; INTRAVENOUS; SUBCUTANEOUS at 06:04

## 2020-04-08 RX ADMIN — OXYCODONE HYDROCHLORIDE AND ACETAMINOPHEN 1 TABLET: 10; 325 TABLET ORAL at 05:04

## 2020-04-08 RX ADMIN — PANTOPRAZOLE SODIUM 40 MG: 40 TABLET, DELAYED RELEASE ORAL at 08:04

## 2020-04-08 RX ADMIN — CYCLOBENZAPRINE HYDROCHLORIDE 10 MG: 10 TABLET, FILM COATED ORAL at 05:04

## 2020-04-08 RX ADMIN — MAGNESIUM HYDROXIDE 2400 MG: 400 SUSPENSION ORAL at 06:04

## 2020-04-08 RX ADMIN — PROMETHAZINE HYDROCHLORIDE 6.25 MG: 25 INJECTION INTRAMUSCULAR; INTRAVENOUS at 12:04

## 2020-04-08 RX ADMIN — HYDROMORPHONE HYDROCHLORIDE 1 MG: 1 INJECTION, SOLUTION INTRAMUSCULAR; INTRAVENOUS; SUBCUTANEOUS at 12:04

## 2020-04-08 RX ADMIN — OXYCODONE HYDROCHLORIDE AND ACETAMINOPHEN 1 TABLET: 10; 325 TABLET ORAL at 01:04

## 2020-04-08 RX ADMIN — ATORVASTATIN CALCIUM 20 MG: 10 TABLET, FILM COATED ORAL at 09:04

## 2020-04-08 RX ADMIN — METHOCARBAMOL TABLETS 750 MG: 750 TABLET, COATED ORAL at 02:04

## 2020-04-08 RX ADMIN — OXYCODONE HYDROCHLORIDE AND ACETAMINOPHEN 1 TABLET: 10; 325 TABLET ORAL at 08:04

## 2020-04-08 RX ADMIN — AMLODIPINE BESYLATE 10 MG: 10 TABLET ORAL at 08:04

## 2020-04-08 RX ADMIN — OXYCODONE HYDROCHLORIDE AND ACETAMINOPHEN 1 TABLET: 10; 325 TABLET ORAL at 09:04

## 2020-04-08 RX ADMIN — METHOCARBAMOL TABLETS 750 MG: 750 TABLET, COATED ORAL at 09:04

## 2020-04-08 RX ADMIN — METHOCARBAMOL TABLETS 750 MG: 750 TABLET, COATED ORAL at 08:04

## 2020-04-08 RX ADMIN — OXYCODONE HYDROCHLORIDE AND ACETAMINOPHEN 1 TABLET: 10; 325 TABLET ORAL at 03:04

## 2020-04-08 NOTE — ASSESSMENT & PLAN NOTE
CT scan revealed L1 burst fracture with retropulsion of fragment posteriorly   Neurosurgery consulted   Plan for surgery tomorrow  NPO after midnight   Neurovascular checks   Analgesics prn  4/6/20   S/p spinal fusion per Dr. Orlando today. Pt tolerated procedure well. Pain controlled on PCA pump.   PT/OT consult   4/7/20  POD #1, patient lying in bed.   Pain controlled on PCA will transition to po pain meds.   PT/OT consult pending, further recommendations to follow.    04/08/2020  POD #2, hemovac drain removed per neurosx.  Pain control  Continue PT/OT  PT/OT recommends Home Health vs Outpt PT  Social work consult for discharge plan   Will start prophylactic Lovenox tomorrow

## 2020-04-08 NOTE — PLAN OF CARE
Patient free from fall and injury.  Pain controlled with oral medications.  Mobility encouraged.  NPO status initiated.  IV hydration maintained.

## 2020-04-08 NOTE — NURSING
Patient unable to tolerate xray board due to pain. Informed PINKY Aguirre NP. New orders noted for one time dose of dilaudid. Will continue to monitor.

## 2020-04-08 NOTE — PLAN OF CARE
[facility] Physician Liaison Note     Attempted to contact patient's family to update them on the patient's progress. Call was placed to the patient's listed family member's home and cell phone numbers. No answer. Will attempt to return call later in the day to update them on the patient's status.    Family member expressed interest in daily updates and appreciated this call.    Dr. Lashell Mora, DPM  Family Communication [place]

## 2020-04-08 NOTE — ASSESSMENT & PLAN NOTE
Continue amlodipine and BB  ACEi held due to GUSTABO   BP stable   4/6/20  Bp stable will resume ACEi in morning   4/7/20  Bp stable   04/08/2020  Will resume lisinopril

## 2020-04-08 NOTE — PT/OT/SLP PROGRESS
Physical Therapy  Treatment    Eliud Frankel   MRN: [id number]   Admitting Diagnosis: Fall (on) (from) unspecified stairs and steps, initial encounter    PT Received On: 04/08/20  PT Start Time: 0800     PT Stop Time: 0810    PT Total Time (min): 10 min       Billable Minutes:  Therapeutic Activity 10    Treatment Type: Treatment  PT/PTA: PT         General Precautions: Standard, fall, respiratory  Orthopedic Precautions: N/A   Braces: TLSO    Subjective:  Communicated with NURSE HYATT prior to session.  Pain/Comfort  Pain Rating 1: 6/10  Location 1: back    Objective:   Patient found with: peripheral IV, telemetry, oxygen    Functional Mobility:  Therapeutic Activities and Exercises:  PT FOUND SUPINE IN BED UPON ARRIVAL, PT DECLINED OOB ACTIVITY AT THIS TIME DUE TO C/O JUST GETTING BACK TO BED, PT AGREEABLE TO EDUCATION, REVIEW DONNING AND DOFFING TLSO BACK BRACE, REVIEW BLE THEREX TO PERFORM WHILE SUPINE IN BED OR SEATED IN CHAIR, PT AGREEABLE TO INCREASE TIME OOB IN CHAIR, PT LEFT WITH HOB ELEVATED, ALL NEEDS MET    AM-PAC 6 CLICK MOBILITY  How much help from another person does this patient currently need?   1 = Unable, Total/Dependent Assistance  2 = A lot, Maximum/Moderate Assistance  3 = A little, Minimum/Contact Guard/Supervision  4 = None, Modified [place]/Independent    Turning over in bed (including adjusting bedclothes, sheets and blankets)?: 1  Sitting down on and standing up from a chair with arms (e.g., wheelchair, bedside commode, etc.): 1  Moving from lying on back to sitting on the side of the bed?: 1  Moving to and from a bed to a chair (including a wheelchair)?: 1  Need to walk in hospital room?: 1  Climbing 3-5 steps with a railing?: 1  Basic Mobility Total Score: 6    AM-PAC Raw Score CMS G-Code Modifier Level of Impairment Assistance   6 % Total / Unable   7 - 9 CM 80 - 100% Maximal Assist   10 - 14 CL 60 - 80% Moderate Assist   15 - 19 CK 40 - 60% Moderate Assist   20 - 22 CJ 20  - 40% Minimal Assist   23 CI 1-20% SBA / CGA   24 CH 0% Independent/ Mod I     Patient left supine with all lines intact, call button in reach and NURSE notified.    Assessment:  Eliud Frankel is a 64 y.o. male with a medical diagnosis of Fall (on) (from) unspecified stairs and steps, initial encounter and presents with IMPAIRED FUNCTIONAL MOBILITY. PT WILL BENEFIT FROM CONT. SKILLED P.T. TO ADDRESS IMPAIRMENTS    Rehab identified problem list/impairments: Rehab identified problem list/impairments: weakness, impaired endurance, impaired functional mobilty, gait instability, impaired balance, decreased coordination, decreased safety awareness, pain    Rehab potential is good.    Activity tolerance: Fair    Discharge recommendations: Discharge Facility/Level of Care Needs: home health PT, outpatient PT     Barriers to discharge:      Equipment recommendations: Equipment Needed After Discharge: walker, rolling, bath bench(IF NEEDED, TBD WITH PROGRESS)     GOALS:   Multidisciplinary Problems     Physical Therapy Goals        Problem: Physical Therapy Goal    Goal Priority Disciplines Outcome Goal Variances Interventions   Physical Therapy Goal     PT, PT/OT Ongoing, Progressing     Description:  LTG'S TO BE MET IN 7 DAYS (4-14-20)  1. PT WILL REQUIRE SPV FOR BED MOBILITY  2. PT WILL REQUIRE SPV FOR TF'S  3. PT WILL ' WITH OR WITHOUT RW AND SPV                    PLAN:    Patient to be seen 5 x/week  to address the above listed problems via gait training, therapeutic activities, therapeutic exercises  Plan of Care expires: 04/14/20  Plan of Care reviewed with: patient    Nat Matthewsard, PT  04/08/2020

## 2020-04-08 NOTE — PLAN OF CARE
Patient currently requires min assist for bed mobility, CGA for transfers with RW, and CGA for ambulation with  feet.

## 2020-04-08 NOTE — SUBJECTIVE & OBJECTIVE
Interval History:  Pt had abdominal pain overnight, Abdominal  imaging reviewed concerning for ileus. CXR showed jess patchy infiltrates however appears to be atelectasis. WBCs normal, afebrile. PO levaquin started empirically. Pt encouraged to ambulate and use IS. Pt verbalized understanding.         Review of Systems   Constitutional: Negative.  Negative for activity change, appetite change, chills, diaphoresis, fatigue and fever.   HENT: Negative.    Eyes: Negative.    Respiratory: Negative.  Negative for cough and shortness of breath.    Cardiovascular: Negative.  Negative for chest pain, palpitations and leg swelling.   Gastrointestinal: Positive for abdominal pain and constipation.   Endocrine: Negative.    Genitourinary: Negative.  Negative for dysuria, flank pain and urgency.   Musculoskeletal: Positive for back pain.   Skin: Negative.    Allergic/Immunologic: Negative.    Neurological: Negative.  Negative for dizziness, syncope, weakness, numbness and headaches.   Hematological: Negative.    Psychiatric/Behavioral: Negative.      Objective:     Vital Signs (Most Recent):  Temp: 97.9 °F (36.6 °C) (04/08/20 1205)  Pulse: 84 (04/08/20 1205)  Resp: 18 (04/08/20 1205)  BP: 123/80 (04/08/20 1205)  SpO2: (!) 93 % (04/08/20 1205) Vital Signs (24h Range):  Temp:  [97.8 °F (36.6 °C)-99 °F (37.2 °C)] 97.9 °F (36.6 °C)  Pulse:  [83-92] 84  Resp:  [18-20] 18  SpO2:  [88 %-97 %] 93 %  BP: (113-159)/(71-83) 123/80     Weight: 127.1 kg (280 lb 3.3 oz)  Body mass index is 38 kg/m².    Intake/Output Summary (Last 24 hours) at 4/8/2020 1405  Last data filed at 4/8/2020 1000  Gross per 24 hour   Intake 1810 ml   Output 1400 ml   Net 410 ml      Physical Exam   Constitutional: He is oriented to person, place, and time. He appears well-developed and well-nourished.   HENT:   Head: Normocephalic and atraumatic.   Eyes: Pupils are equal, round, and reactive to light. EOM are normal.   Neck: Normal range of motion. Neck supple.    Cardiovascular: Normal rate, regular rhythm, normal heart sounds, intact distal pulses and normal pulses.   Pulmonary/Chest: Effort normal and breath sounds normal. No accessory muscle usage. No tachypnea. No respiratory distress.   Abdominal: Soft. Normal appearance and bowel sounds are normal. There is no tenderness.   Musculoskeletal:        Lumbar back: He exhibits decreased range of motion (secondary to pain ), tenderness and pain.        Arms:  Sensation intact to light touch  Moves all 4 extremities   Neurological: He is alert and oriented to person, place, and time. He has normal reflexes.   Skin: Skin is warm, dry and intact. Capillary refill takes less than 2 seconds.   Psychiatric: He has a normal mood and affect. His speech is normal and behavior is normal. Judgment and thought content normal. Cognition and memory are normal.   Nursing note and vitals reviewed.      Significant Labs:   BMP:   Recent Labs   Lab 04/08/20  0440     107     140   K 3.9  3.9     109   CO2 23  23   BUN 18  18   CREATININE 0.9  0.9   CALCIUM 8.0*  8.0*     CBC:   Recent Labs   Lab 04/07/20  0412 04/08/20  0440   WBC 11.21  11.21 10.92  10.92   HGB 10.5*  10.5* 10.5*  10.5*   HCT 33.0*  33.0* 32.0*  32.0*   *  136* 132*  132*       Significant Imaging:   Imaging Results          CT Lumbar Spine Without Contrast (Final result)  Result time 04/05/20 15:34:45    Final result by Rizwan Albarran MD (Timothy) (04/05/20 15:34:45)                 Impression:      Severely comminuted fracture involving the L1 vertebral body with associated decreased vertebral body height.  Large retropulsed fracture fragment causing moderate spinal stenosis.  There is also a vertical component extending through the lamina of L1 on the left.    Extensive right nephrolithiasis.  8 mm stone involving the left renal pelvis.  No definite hydronephrosis.    All CT scans at this facility are performed  using dose  modulation techniques as appropriate to performed exam including the following:  automated exposure control; adjustment of mA and/or kV according to the patients size (this includes techniques or standardized protocols for targeted exams where dose is matched to indication/reason for exam: i.e. extremities or head);  iterative reconstruction technique.      Electronically signed by: Rizwan Albarran MD  Date:    04/05/2020  Time:    15:34             Narrative:    EXAMINATION:  CT LUMBAR SPINE WITHOUT CONTRAST    CLINICAL HISTORY:  Spine fracture, traumatic, lumbar;Low back pain, risk factors (osteoporosis or chronic steroid use or elderly);    TECHNIQUE:  Standard noncontrast CT scan of the lumbar spine.    COMPARISON:  None    FINDINGS:  There is a comminuted fracture involving the L1 vertebral body associated with decreased vertebral body height.  There is a large retropulsed fracture fragment noted measuring 1.7 by 1.3 cm causing moderate spinal stenosis.  There is also a component of the fracture extending through the lamina on L1 left.    No other fractures identified.  No disc herniations.  There is evidence of facet arthropathy bilaterally at L4-5 and L5-S1.    There is evidence of nephrolithiasis involving the right kidney.  There is a 8 mm stone in the left renal pelvis.  No definite hydronephrosis.

## 2020-04-08 NOTE — NURSING
Patient unable to urinate. Bladder scan performed showing 498mL. Informed PINKY Aguirre NP. New orders for diop to be placed. Will continue to monitor.

## 2020-04-08 NOTE — PT/OT/SLP PROGRESS
Physical Therapy  Treatment    Eliud Frankel   MRN: [id number]   Admitting Diagnosis: Fall (on) (from) unspecified stairs and steps, initial encounter    PT Received On: 04/08/20  PT Start Time: 1004     PT Stop Time: 1021    PT Total Time (min): 17 min       Billable Minutes:  Gait Training 17 min    Treatment Type: Treatment  PT/PTA: PT     PTA Visit Number: 0       General Precautions: Standard, fall, respiratory  Orthopedic Precautions: spinal precautions   Braces: TLSO    Spiritual, Cultural Beliefs, Nondenominational Practices, Values that Affect Care: no    Subjective:  Communicated with Epic and nurse Cristina prior to session.  Patient agreeable to gait training with therapy.    Pain/Comfort  Pain Rating 1: 6/10  Location - Orientation 1: generalized  Location 1: back  Pain Addressed 1: Pre-medicate for activity, Reposition, Distraction  Pain Rating Post-Intervention 1: 6/10    Objective:   Patient found with: peripheral IV, telemetry, oxygen, diop catheter    Functional Mobility:  Bed Mobility:    Rolling to left, sidelying -> sit, seated scooting min assist.    Transfers:   Transfer training sit <-> stand with RW with CGA and verbal cues for hand placement and safe, effective technique.    Gait:    Gait training in room with RW and 150 feet in hallway with RW with CGA and verbal cues for postural control.  Slow myke, head down/flexed posture.  3L 02 in tow.    Balance:   Static Sit: GOOD: Takes MODERATE challenges from all directions  Dynamic Sit: FAIR+: Maintains balance through MINIMAL excursions of active trunk motion  Static Stand: FAIR: Maintains without assist but unable to take challenges  Dynamic stand: FAIR: Needs CONTACT GUARD during gait     Therapeutic Activities and Exercises:  Patient participated in transfer and gait training with RW (see details above).  Reviewed procedure for donning and adjusting TLSO.  Patient demonstrated knowledge of seated BLE therapeutic exercises.  Encouraged patient to  sit up in bedside chair as long as tolerated.  Educated patient on fall risk and need to call nurse's station for assistance when ready to return to bed.    AM-PAC 6 CLICK MOBILITY  How much help from another person does this patient currently need?   1 = Unable, Total/Dependent Assistance  2 = A lot, Maximum/Moderate Assistance  3 = A little, Minimum/Contact Guard/Supervision  4 = None, Modified [place]/Independent    Turning over in bed (including adjusting bedclothes, sheets and blankets)?: 3  Sitting down on and standing up from a chair with arms (e.g., wheelchair, bedside commode, etc.): 3  Moving from lying on back to sitting on the side of the bed?: 3  Moving to and from a bed to a chair (including a wheelchair)?: 3  Need to walk in hospital room?: 3  Climbing 3-5 steps with a railing?: 1(not assessed)  Basic Mobility Total Score: 16    AM-PAC Raw Score CMS G-Code Modifier Level of Impairment Assistance   6 % Total / Unable   7 - 9 CM 80 - 100% Maximal Assist   10 - 14 CL 60 - 80% Moderate Assist   15 - 19 CK 40 - 60% Moderate Assist   20 - 22 CJ 20 - 40% Minimal Assist   23 CI 1-20% SBA / CGA   24 CH 0% Independent/ Mod I     Patient left up in chair with all lines intact, call button in reach and chair alarm on.    Assessment:  Patient significantly increased ambulation distance today.  Needs further education on TLSO donning/adjustment.  Would benefit from continued skilled PT services as per POC.    Rehab identified problem list/impairments: Rehab identified problem list/impairments: weakness, impaired endurance, impaired self care skills, impaired functional mobilty, gait instability, impaired balance, decreased safety awareness, pain, decreased lower extremity function    Rehab potential is good.    Activity tolerance: Good    Discharge recommendations: Discharge Facility/Level of Care Needs: home health PT, outpatient PT     Barriers to discharge:      Equipment recommendations: Equipment  Needed After Discharge: walker, rolling, bath bench     GOALS:   Multidisciplinary Problems     Physical Therapy Goals        Problem: Physical Therapy Goal    Goal Priority Disciplines Outcome Goal Variances Interventions   Physical Therapy Goal     PT, PT/OT Ongoing, Progressing     Description:  LTG'S TO BE MET IN 7 DAYS (4-14-20)  1. PT WILL REQUIRE SPV FOR BED MOBILITY  2. PT WILL REQUIRE SPV FOR TF'S  3. PT WILL ' WITH OR WITHOUT RW AND SPV                    PLAN:    Patient to be seen 5 x/week  to address the above listed problems via gait training, therapeutic activities, therapeutic exercises  Plan of Care expires: 04/14/20  Plan of Care reviewed with: patient         Susan Roberts, PT, DPT  04/08/2020

## 2020-04-08 NOTE — PLAN OF CARE
Problem: Adult Inpatient Plan of Care  Goal: Plan of Care Review  Outcome: Ongoing, Progressing    POC reviewed, including indications and possible side effects of administered medications. Patient verbalized understanding and teach back. No adverse reactions noted. Patient c/o back and abdominal pain r/t constipation. Adminstered medications per order. Dressing remains intact with dried drainage. [place] and drain care performed. VSS. NADN. Patient remains free of falls and injuries during shift. Will continue to monitor.    Chart check complete.

## 2020-04-08 NOTE — NURSING
"Patient c/o light headedness and states "feels like I'm upside down." Sating at 86% on RA. 3.5L oxygen placed via NC and currently sating at 94%. Patient also c/o abdominal pain but unable to have a bowel movement. Attempted to put patient on bedpan but unable to due to pain. Informed PINKY Aguirre NP. New orders noted. Will continue to monitor.  "

## 2020-04-08 NOTE — PROGRESS NOTES
[facility]  Neurosurgery  Progress Note    Subjective:     History of Present Illness: No notes on file    Post-Op Info:  Procedure(s) (LRB):  FUSION, SPINE, POSTERIOR SPINAL COLUMN, LUMBAR, USING COMPUTER-ASSISTED NAVIGATION   (Bilateral)   2 Days Post-Op   Patient seen this am on commode   Ambulated from there to bed with minimal assistance   States back pain as expected with ambulation   No LE symptoms or N/T   ambulated with therapy   Diop in place     Vitals /labs reviewed     MAEW   Incision cdi   HV drain removed   Bacitracin applied to incision and covered with a dressing   Diop still in place     Assessment/Plan:     Patient doing well PO day # 2 fusion for L1 burst fx   D/c diop   Increase activity with PT as tolerated   Case management consult   Likely home with home health vs rehab depending on PT progress / recs  Will tx constipation as well   Can start prophylactic dose Lovenox tomorrow        Donaldo Orlando MD  Neurosurgery  [facility]

## 2020-04-08 NOTE — PROGRESS NOTES
[facility] Hospital Medicine  Progress Note    Patient Name: Eliud Frankel  MRN: [id number]  Patient Class: IP- Inpatient   Admission Date: 4/5/2020  Length of Stay: 3 days  Attending Physician: Alessandra Mccracken MD  Primary Care Provider: Primary Doctor No        Subjective:     Principal Problem:Fall (on) (from) unspecified stairs and steps, initial encounter        HPI:  Eliud Frankel is a 64 y.o. male patient with a PMHx of GERD, HLD, and HTN who presents to the Emergency Department for evaluation of fall from ladder of approximately 8 ft which onset suddenly just PTA. Symptoms are constant and moderate in severity. No mitigating or exacerbating factors reported. Associated sxs include right lower side/back pain. Patient denies any LOC, head injury/trauma, neck pain, CP, SOB, abd pain, fever, chills, numbness, weakness, n/v/d, and all other sxs at this time. Prior tx includes pain meds en route. In the ED, creatinine 1.6 otherwise labs unremarkable. CT spine revealed severely comminuted fracture involving the L1 vertebral body with associated decreased vertebral body height.  Large retropulsed fracture fragment causing moderate spinal stenosis.  There is also a vertical component extending through the lamina of L1 on the left. Extensive right nephrolithiasis. 8 mm stone involving the left renal pelvis. No definite hydronephrosis. Dr. Orlando was consulted in the ED, plans for surgery tomorrow. Hospital medicine called for admission.     Overview/Hospital Course:  64 year old admitted for spinal fracture. CT showed L1 burst fx with 3 column injury. Neurosurgery consulted. MRI shows acute compression fracture of L1 with associated 30% loss of height.  Retropulsion of the dorsal cortex of L1 with moderate to severe central canal stenosis with probable distal cord/conus impingement.  Paraspinal soft tissue edema. As of 4/6 pt s/p spinal fusion per Dr. Orlando today. Pt tolerated procedure well. Pain  controlled on PCA pump. As of 4/7 POD #1, patient lying in bed. Pain controlled on PCA will transition to po pain meds. Reports back pain with movement only. PT/OT consult pending, further recommendations to follow. TLSO brace ordered. As of 4/8 pt had abdominal pain overnight, Abdominal  imaging reviewed concerning for ileus. CXR showed jess patchy infiltrates however appears to be atelectasis. WBCs normal, afebrile. PO levaquin started empirically. Pt encouraged to ambulate and use IS. Pt verbalized understanding.         Interval History:  Pt had abdominal pain overnight, Abdominal  imaging reviewed concerning for ileus. CXR showed jess patchy infiltrates however appears to be atelectasis. WBCs normal, afebrile. PO levaquin started empirically. Pt encouraged to ambulate and use IS. Pt verbalized understanding.         Review of Systems   Constitutional: Negative.  Negative for activity change, appetite change, chills, diaphoresis, fatigue and fever.   HENT: Negative.    Eyes: Negative.    Respiratory: Negative.  Negative for cough and shortness of breath.    Cardiovascular: Negative.  Negative for chest pain, palpitations and leg swelling.   Gastrointestinal: Positive for abdominal pain and constipation.   Endocrine: Negative.    Genitourinary: Negative.  Negative for dysuria, flank pain and urgency.   Musculoskeletal: Positive for back pain.   Skin: Negative.    Allergic/Immunologic: Negative.    Neurological: Negative.  Negative for dizziness, syncope, weakness, numbness and headaches.   Hematological: Negative.    Psychiatric/Behavioral: Negative.      Objective:     Vital Signs (Most Recent):  Temp: 97.9 °F (36.6 °C) (04/08/20 1205)  Pulse: 84 (04/08/20 1205)  Resp: 18 (04/08/20 1205)  BP: 123/80 (04/08/20 1205)  SpO2: (!) 93 % (04/08/20 1205) Vital Signs (24h Range):  Temp:  [97.8 °F (36.6 °C)-99 °F (37.2 °C)] 97.9 °F (36.6 °C)  Pulse:  [83-92] 84  Resp:  [18-20] 18  SpO2:  [88 %-97 %] 93 %  BP:  (113-159)/(71-83) 123/80     Weight: 127.1 kg (280 lb 3.3 oz)  Body mass index is 38 kg/m².    Intake/Output Summary (Last 24 hours) at 4/8/2020 1405  Last data filed at 4/8/2020 1000  Gross per 24 hour   Intake 1810 ml   Output 1400 ml   Net 410 ml      Physical Exam   Constitutional: He is oriented to person, place, and time. He appears well-developed and well-nourished.   HENT:   Head: Normocephalic and atraumatic.   Eyes: Pupils are equal, round, and reactive to light. EOM are normal.   Neck: Normal range of motion. Neck supple.   Cardiovascular: Normal rate, regular rhythm, normal heart sounds, intact distal pulses and normal pulses.   Pulmonary/Chest: Effort normal and breath sounds normal. No accessory muscle usage. No tachypnea. No respiratory distress.   Abdominal: Soft. Normal appearance and bowel sounds are normal. There is no tenderness.   Musculoskeletal:        Lumbar back: He exhibits decreased range of motion (secondary to pain ), tenderness and pain.        Arms:  Sensation intact to light touch  Moves all 4 extremities   Neurological: He is alert and oriented to person, place, and time. He has normal reflexes.   Skin: Skin is warm, dry and intact. Capillary refill takes less than 2 seconds.   Psychiatric: He has a normal mood and affect. His speech is normal and behavior is normal. Judgment and thought content normal. Cognition and memory are normal.   Nursing note and vitals reviewed.      Significant Labs:   BMP:   Recent Labs   Lab 04/08/20  0440     107     140   K 3.9  3.9     109   CO2 23  23   BUN 18  18   CREATININE 0.9  0.9   CALCIUM 8.0*  8.0*     CBC:   Recent Labs   Lab 04/07/20  0412 04/08/20  0440   WBC 11.21  11.21 10.92  10.92   HGB 10.5*  10.5* 10.5*  10.5*   HCT 33.0*  33.0* 32.0*  32.0*   *  136* 132*  132*       Significant Imaging:   Imaging Results          CT Lumbar Spine Without Contrast (Final result)  Result time 04/05/20 15:34:45     Final result by Rizwan Albarran MD (Timothy) (04/05/20 15:34:45)                 Impression:      Severely comminuted fracture involving the L1 vertebral body with associated decreased vertebral body height.  Large retropulsed fracture fragment causing moderate spinal stenosis.  There is also a vertical component extending through the lamina of L1 on the left.    Extensive right nephrolithiasis.  8 mm stone involving the left renal pelvis.  No definite hydronephrosis.    All CT scans at this facility are performed  using dose modulation techniques as appropriate to performed exam including the following:  automated exposure control; adjustment of mA and/or kV according to the patients size (this includes techniques or standardized protocols for targeted exams where dose is matched to indication/reason for exam: i.e. extremities or head);  iterative reconstruction technique.      Electronically signed by: Rizwan Albarran MD  Date:    04/05/2020  Time:    15:34             Narrative:    EXAMINATION:  CT LUMBAR SPINE WITHOUT CONTRAST    CLINICAL HISTORY:  Spine fracture, traumatic, lumbar;Low back pain, risk factors (osteoporosis or chronic steroid use or elderly);    TECHNIQUE:  Standard noncontrast CT scan of the lumbar spine.    COMPARISON:  None    FINDINGS:  There is a comminuted fracture involving the L1 vertebral body associated with decreased vertebral body height.  There is a large retropulsed fracture fragment noted measuring 1.7 by 1.3 cm causing moderate spinal stenosis.  There is also a component of the fracture extending through the lamina on L1 left.    No other fractures identified.  No disc herniations.  There is evidence of facet arthropathy bilaterally at L4-5 and L5-S1.    There is evidence of nephrolithiasis involving the right kidney.  There is a 8 mm stone in the left renal pelvis.  No definite hydronephrosis.                                Assessment/Plan:      * Fall (on) (from) unspecified  stairs and steps, initial encounter  CT scan revealed L1 burst fracture with retropulsion of fragment posteriorly   Neurosurgery consulted   Plan for surgery tomorrow  NPO after midnight   Neurovascular checks   Analgesics prn  4/6/20   S/p spinal fusion per Dr. Orlando today. Pt tolerated procedure well. Pain controlled on PCA pump.   PT/OT consult   4/7/20  POD #1, patient lying in bed.   Pain controlled on PCA will transition to po pain meds.   PT/OT consult pending, further recommendations to follow.    04/08/2020  POD #2, hemovac drain removed per neurosx.  Pain control  Continue PT/OT  PT/OT recommends Home Health vs Outpt PT  Social work consult for discharge plan   Will start prophylactic Lovenox tomorrow            Atelectasis  04/08/2020  Encourage IS      Ileus  04/08/2020  NPO  Bowel rest    IVFs  Encourage ambulation          GERD (gastroesophageal reflux disease)  Continue Protonix       Hyperlipidemia  Continue statin       Hypertension  Continue amlodipine and BB  ACEi held due to GUSTABO   BP stable   4/6/20  Bp stable will resume ACEi in morning   4/7/20  Bp stable   04/08/2020  Will resume lisinopril         VTE Risk Mitigation (From admission, onward)         Ordered     IP VTE HIGH RISK PATIENT  Once      04/06/20 1622     Place FRANNY hose  Until discontinued      04/06/20 1622     Place sequential compression device  Until discontinued      04/06/20 1622                      Lore Ozuna NP  Department of Hospital Medicine   [facility]

## 2020-04-09 VITALS
BODY MASS INDEX: 38.85 KG/M2 | HEIGHT: 72 IN | WEIGHT: 286.81 LBS | TEMPERATURE: 98 F | SYSTOLIC BLOOD PRESSURE: 129 MMHG | HEART RATE: 76 BPM | RESPIRATION RATE: 18 BRPM | OXYGEN SATURATION: 94 % | DIASTOLIC BLOOD PRESSURE: 77 MMHG

## 2020-04-09 PROBLEM — K56.7 ILEUS: Status: RESOLVED | Noted: 2020-04-08 | Resolved: 2020-04-09

## 2020-04-09 LAB
ANION GAP SERPL CALC-SCNC: 8 MMOL/L (ref 8–16)
ANION GAP SERPL CALC-SCNC: 8 MMOL/L (ref 8–16)
BASOPHILS # BLD AUTO: 0.01 K/UL (ref 0–0.2)
BASOPHILS # BLD AUTO: 0.01 K/UL (ref 0–0.2)
BASOPHILS NFR BLD: 0.1 % (ref 0–1.9)
BASOPHILS NFR BLD: 0.1 % (ref 0–1.9)
BUN SERPL-MCNC: 22 MG/DL (ref 8–23)
BUN SERPL-MCNC: 22 MG/DL (ref 8–23)
CALCIUM SERPL-MCNC: 8.4 MG/DL (ref 8.7–10.5)
CALCIUM SERPL-MCNC: 8.4 MG/DL (ref 8.7–10.5)
CHLORIDE SERPL-SCNC: 108 MMOL/L (ref 95–110)
CHLORIDE SERPL-SCNC: 108 MMOL/L (ref 95–110)
CO2 SERPL-SCNC: 27 MMOL/L (ref 23–29)
CO2 SERPL-SCNC: 27 MMOL/L (ref 23–29)
CREAT SERPL-MCNC: 0.8 MG/DL (ref 0.5–1.4)
CREAT SERPL-MCNC: 0.8 MG/DL (ref 0.5–1.4)
DIFFERENTIAL METHOD: ABNORMAL
DIFFERENTIAL METHOD: ABNORMAL
EOSINOPHIL # BLD AUTO: 0 K/UL (ref 0–0.5)
EOSINOPHIL # BLD AUTO: 0 K/UL (ref 0–0.5)
EOSINOPHIL NFR BLD: 0.3 % (ref 0–8)
EOSINOPHIL NFR BLD: 0.3 % (ref 0–8)
ERYTHROCYTE [DISTWIDTH] IN BLOOD BY AUTOMATED COUNT: 13 % (ref 11.5–14.5)
ERYTHROCYTE [DISTWIDTH] IN BLOOD BY AUTOMATED COUNT: 13 % (ref 11.5–14.5)
EST. GFR  (AFRICAN AMERICAN): >60 ML/MIN/1.73 M^2
EST. GFR  (AFRICAN AMERICAN): >60 ML/MIN/1.73 M^2
EST. GFR  (NON AFRICAN AMERICAN): >60 ML/MIN/1.73 M^2
EST. GFR  (NON AFRICAN AMERICAN): >60 ML/MIN/1.73 M^2
GLUCOSE SERPL-MCNC: 116 MG/DL (ref 70–110)
GLUCOSE SERPL-MCNC: 116 MG/DL (ref 70–110)
HCT VFR BLD AUTO: 34.8 % (ref 40–54)
HCT VFR BLD AUTO: 34.8 % (ref 40–54)
HGB BLD-MCNC: 11.1 G/DL (ref 14–18)
HGB BLD-MCNC: 11.1 G/DL (ref 14–18)
IMM GRANULOCYTES # BLD AUTO: 0.07 K/UL (ref 0–0.04)
IMM GRANULOCYTES # BLD AUTO: 0.07 K/UL (ref 0–0.04)
IMM GRANULOCYTES NFR BLD AUTO: 0.6 % (ref 0–0.5)
IMM GRANULOCYTES NFR BLD AUTO: 0.6 % (ref 0–0.5)
LYMPHOCYTES # BLD AUTO: 0.9 K/UL (ref 1–4.8)
LYMPHOCYTES # BLD AUTO: 0.9 K/UL (ref 1–4.8)
LYMPHOCYTES NFR BLD: 8.1 % (ref 18–48)
LYMPHOCYTES NFR BLD: 8.1 % (ref 18–48)
MCH RBC QN AUTO: 32.4 PG (ref 27–31)
MCH RBC QN AUTO: 32.4 PG (ref 27–31)
MCHC RBC AUTO-ENTMCNC: 31.9 G/DL (ref 32–36)
MCHC RBC AUTO-ENTMCNC: 31.9 G/DL (ref 32–36)
MCV RBC AUTO: 102 FL (ref 82–98)
MCV RBC AUTO: 102 FL (ref 82–98)
MONOCYTES # BLD AUTO: 1 K/UL (ref 0.3–1)
MONOCYTES # BLD AUTO: 1 K/UL (ref 0.3–1)
MONOCYTES NFR BLD: 9.2 % (ref 4–15)
MONOCYTES NFR BLD: 9.2 % (ref 4–15)
NEUTROPHILS # BLD AUTO: 9.2 K/UL (ref 1.8–7.7)
NEUTROPHILS # BLD AUTO: 9.2 K/UL (ref 1.8–7.7)
NEUTROPHILS NFR BLD: 81.7 % (ref 38–73)
NEUTROPHILS NFR BLD: 81.7 % (ref 38–73)
NRBC BLD-RTO: 0 /100 WBC
NRBC BLD-RTO: 0 /100 WBC
PLATELET # BLD AUTO: 158 K/UL (ref 150–350)
PLATELET # BLD AUTO: 158 K/UL (ref 150–350)
PMV BLD AUTO: 10.8 FL (ref 9.2–12.9)
PMV BLD AUTO: 10.8 FL (ref 9.2–12.9)
POTASSIUM SERPL-SCNC: 4.5 MMOL/L (ref 3.5–5.1)
POTASSIUM SERPL-SCNC: 4.5 MMOL/L (ref 3.5–5.1)
RBC # BLD AUTO: 3.43 M/UL (ref 4.6–6.2)
RBC # BLD AUTO: 3.43 M/UL (ref 4.6–6.2)
SODIUM SERPL-SCNC: 143 MMOL/L (ref 136–145)
SODIUM SERPL-SCNC: 143 MMOL/L (ref 136–145)
WBC # BLD AUTO: 11.29 K/UL (ref 3.9–12.7)
WBC # BLD AUTO: 11.29 K/UL (ref 3.9–12.7)

## 2020-04-09 PROCEDURE — 85025 COMPLETE CBC W/AUTO DIFF WBC: CPT

## 2020-04-09 PROCEDURE — 97530 THERAPEUTIC ACTIVITIES: CPT

## 2020-04-09 PROCEDURE — 97116 GAIT TRAINING THERAPY: CPT

## 2020-04-09 PROCEDURE — 97535 SELF CARE MNGMENT TRAINING: CPT

## 2020-04-09 PROCEDURE — 63600175 PHARM REV CODE 636 W HCPCS: Performed by: NURSE PRACTITIONER

## 2020-04-09 PROCEDURE — 36415 COLL VENOUS BLD VENIPUNCTURE: CPT

## 2020-04-09 PROCEDURE — 25000003 PHARM REV CODE 250: Performed by: NURSE PRACTITIONER

## 2020-04-09 PROCEDURE — 25000003 PHARM REV CODE 250: Performed by: PHYSICIAN ASSISTANT

## 2020-04-09 PROCEDURE — 94799 UNLISTED PULMONARY SVC/PX: CPT

## 2020-04-09 PROCEDURE — 80048 BASIC METABOLIC PNL TOTAL CA: CPT

## 2020-04-09 RX ORDER — OXYCODONE AND ACETAMINOPHEN 10; 325 MG/1; MG/1
1 TABLET ORAL EVERY 8 HOURS PRN
Qty: 30 TABLET | Refills: 0 | Status: SHIPPED | OUTPATIENT
Start: 2020-04-09 | End: 2020-04-09

## 2020-04-09 RX ORDER — OXYCODONE AND ACETAMINOPHEN 10; 325 MG/1; MG/1
1 TABLET ORAL EVERY 8 HOURS PRN
Qty: 30 TABLET | Refills: 0 | Status: SHIPPED | OUTPATIENT
Start: 2020-04-09 | End: 2020-04-16 | Stop reason: SDUPTHER

## 2020-04-09 RX ORDER — LEVOFLOXACIN 750 MG/1
750 TABLET ORAL DAILY
Qty: 6 TABLET | Refills: 0 | Status: SHIPPED | OUTPATIENT
Start: 2020-04-10 | End: 2020-04-16

## 2020-04-09 RX ORDER — METHOCARBAMOL 750 MG/1
750 TABLET, FILM COATED ORAL 3 TIMES DAILY
Qty: 30 TABLET | Refills: 0 | Status: SHIPPED | OUTPATIENT
Start: 2020-04-09 | End: 2020-04-19

## 2020-04-09 RX ORDER — DOCUSATE SODIUM 100 MG/1
100 CAPSULE, LIQUID FILLED ORAL DAILY
Qty: 14 CAPSULE | Refills: 0 | Status: SHIPPED | OUTPATIENT
Start: 2020-04-09 | End: 2020-04-23

## 2020-04-09 RX ADMIN — CYCLOBENZAPRINE HYDROCHLORIDE 10 MG: 10 TABLET, FILM COATED ORAL at 11:04

## 2020-04-09 RX ADMIN — LEVOFLOXACIN 750 MG: 750 TABLET, FILM COATED ORAL at 09:04

## 2020-04-09 RX ADMIN — PANTOPRAZOLE SODIUM 40 MG: 40 TABLET, DELAYED RELEASE ORAL at 09:04

## 2020-04-09 RX ADMIN — METHOCARBAMOL TABLETS 750 MG: 750 TABLET, COATED ORAL at 09:04

## 2020-04-09 RX ADMIN — OXYCODONE HYDROCHLORIDE AND ACETAMINOPHEN 1 TABLET: 10; 325 TABLET ORAL at 07:04

## 2020-04-09 RX ADMIN — NEBIVOLOL HYDROCHLORIDE 10 MG: 10 TABLET ORAL at 09:04

## 2020-04-09 RX ADMIN — OXYCODONE HYDROCHLORIDE AND ACETAMINOPHEN 1 TABLET: 10; 325 TABLET ORAL at 12:04

## 2020-04-09 RX ADMIN — METHOCARBAMOL TABLETS 750 MG: 750 TABLET, COATED ORAL at 02:04

## 2020-04-09 RX ADMIN — OXYCODONE AND ACETAMINOPHEN 1 TABLET: 7.5; 325 TABLET ORAL at 02:04

## 2020-04-09 RX ADMIN — AMLODIPINE BESYLATE 10 MG: 10 TABLET ORAL at 09:04

## 2020-04-09 NOTE — PT/OT/SLP PROGRESS
Physical Therapy  Treatment    Eliud Frankel   MRN: [id number]   Admitting Diagnosis: Fall (on) (from) unspecified stairs and steps, initial encounter    PT Received On: 04/09/20  PT Start Time: 1050     PT Stop Time: 1115    PT Total Time (min): 25 min       Billable Minutes:  Gait Training 15 and Therapeutic Activity 10    Treatment Type: Treatment  PT/PTA: PT          General Precautions: Standard, fall  Orthopedic Precautions: spinal precautions   Braces: TLSO    Subjective:  Communicated with NURSE IDALIA prior to session.  Pain/Comfort  Pain Rating 1: 9/10  Location - Orientation 1: lower  Location 1: back    Objective:   Patient found with: peripheral IV, telemetry    Functional Mobility:  Therapeutic Activities and Exercises:  PT FOUND SUPINE IN BED UPON ARRIVAL, AGITATED ABOUT HAVING TO MOVE DUE TO PAIN, NURSE CALLED FOR MED, SUP>SIT WITH SBA USING LOG ROLL, SEATED SCOOT TO EOB WITH SBA, MAXA FOR DONNING ROBE, MAXA FOR ADJUSTING SOCKS, MAXA FOR DONNING TLSO BRACE, PT APPEARS TO BE SELF LIMITING WITH FUNCTIONAL TASK, SIT>STAND WITH MIN/CGA, REVIEW RW USE AND SAFETY DURING TF'S AND GAIT, PT AMB 25' X 2 TRIALS WITH RW AND CGA, SLOW PACE, C/O PAIN, REFUSE FURTHER DISTANCE DESPITE ENCOURAGEMENT, PT RETURN TO ROOM TO BEDSIDE CHAIR WITH CGA, REVIEW BLE THEREX TO PERFORM WHILE SEATED IN CHAIR    AM-PAC 6 CLICK MOBILITY  How much help from another person does this patient currently need?   1 = Unable, Total/Dependent Assistance  2 = A lot, Maximum/Moderate Assistance  3 = A little, Minimum/Contact Guard/Supervision  4 = None, Modified [place]/Independent    Turning over in bed (including adjusting bedclothes, sheets and blankets)?: 4  Sitting down on and standing up from a chair with arms (e.g., wheelchair, bedside commode, etc.): 3  Moving from lying on back to sitting on the side of the bed?: 4  Moving to and from a bed to a chair (including a wheelchair)?: 3  Need to walk in hospital room?: 3  Climbing 3-5  steps with a railing?: 1  Basic Mobility Total Score: 18    AM-PAC Raw Score CMS G-Code Modifier Level of Impairment Assistance   6 % Total / Unable   7 - 9 CM 80 - 100% Maximal Assist   10 - 14 CL 60 - 80% Moderate Assist   15 - 19 CK 40 - 60% Moderate Assist   20 - 22 CJ 20 - 40% Minimal Assist   23 CI 1-20% SBA / CGA   24 CH 0% Independent/ Mod I     Patient left up in chair with all lines intact, call button in reach, chair alarm on and NURSE notified.    Assessment:  Eliud Frankel is a 64 y.o. male with a medical diagnosis of Fall (on) (from) unspecified stairs and steps, initial encounter and presents with IMPAIRED FUNCTIONAL MOBILITY. PT WILL BENEFIT FROM CONT. SKILLED P.T. TO ADDRESS IMPAIRMENTS    Rehab identified problem list/impairments: Rehab identified problem list/impairments: weakness, impaired endurance, impaired functional mobilty, gait instability, impaired balance, decreased coordination, decreased safety awareness    Rehab potential is good.    Activity tolerance: Good    Discharge recommendations: Discharge Facility/Level of Care Needs: home health PT, outpatient PT     Barriers to discharge:      Equipment recommendations: Equipment Needed After Discharge: walker, rolling, bath bench     GOALS:   Multidisciplinary Problems     Physical Therapy Goals        Problem: Physical Therapy Goal    Goal Priority Disciplines Outcome Goal Variances Interventions   Physical Therapy Goal     PT, PT/OT Ongoing, Progressing     Description:  LTG'S TO BE MET IN 7 DAYS (4-14-20)  1. PT WILL REQUIRE SPV FOR BED MOBILITY  2. PT WILL REQUIRE SPV FOR TF'S  3. PT WILL ' WITH OR WITHOUT RW AND SPV                    PLAN:    Patient to be seen 5 x/week  to address the above listed problems via gait training, therapeutic activities, therapeutic exercises  Plan of Care expires: 04/14/20  Plan of Care reviewed with: patient    Nat Matthewsard, PT  04/09/2020

## 2020-04-09 NOTE — NURSING
Verbalized understanding of discharge paperwork, follow up care, prescriptions, etc.  Denies having any questions.  Wife called to come pick patient up.

## 2020-04-09 NOTE — NURSING
Pt placed in L side lying position to administer soap suds enema. Pt tolerated well. BSC placed in room for quicker access.

## 2020-04-09 NOTE — PT/OT/SLP PROGRESS
Occupational Therapy   Treatment    Name: Eliud Frankel  MRN: [id number]  Admitting Diagnosis:  Fall (on) (from) unspecified stairs and steps, initial encounter  3 Days Post-Op    Recommendations:     Discharge Recommendations: home health OT, outpatient OT  Discharge Equipment Recommendations:  none  Barriers to discharge:  None    Assessment:     Eliud Frankel is a 64 y.o. male with a medical diagnosis of Fall (on) (from) unspecified stairs and steps, initial encounter.  He presents with debility and generalized weakness. Performance deficits affecting function are weakness, impaired self care skills, impaired balance, decreased coordination, decreased safety awareness, impaired endurance, impaired functional mobilty, decreased lower extremity function.     Rehab Prognosis:  Good; patient would benefit from acute skilled OT services to address these deficits and reach maximum level of function.       Plan:     Patient to be seen 3 x/week to address the above listed problems via self-care/home management, therapeutic exercises, therapeutic activities  · Plan of Care Expires: 04/14/20  · Plan of Care Reviewed with: patient    Subjective     Pain/Comfort:  · Pain Rating 1: 9/10  · Location - Orientation 1: lower  · Location 1: back    Objective:     Communicated with: nurse and epic chart review prior to session.  Patient found HOB elevated with telemetry, peripheral IV upon OT entry to room.    General Precautions: Standard, fall   Orthopedic Precautions:spinal precautions   Braces: TLSO     Occupational Performance:     Bed Mobility:    · Patient completed Rolling/Turning to Left with  stand by assistance and verbal cues for log rolling  · Patient completed Scooting/Bridging with stand by assistance  · Patient completed Supine to Sit with stand by assistance     Functional Mobility/Transfers:  · Patient completed Sit <> Stand Transfer with contact guard assistance  with  rolling walker   · Patient completed Bed  <> Chair Transfer using Step Transfer technique with contact guard assistance with rolling walker  Functional Mobility: pt ambulated 25 feet x 2 with cga and verbal cues for safety and techniques  Activities of Daily Living:  · Upper Body Dressing: maximal assistance jennifer/doff brace      [facility] 6 Click ADL: 19    Treatment & Education:  Pt educated on jennifer/doff brace. Pt verbalized understanding bu refused to demonstrate.    Patient left up in chair with all lines intact, call button in reach, chair alarm on and nurse notifed notifiedEducation:      GOALS:   Multidisciplinary Problems     Occupational Therapy Goals        Problem: Occupational Therapy Goal    Goal Priority Disciplines Outcome Interventions   Occupational Therapy Goal     OT, PT/OT     Description:  OT GOALS TO BE MET BY 4-14-20  SBA WITH UE DRESSING  PT WILL TOLERATE 1 SET X 15 REPS B UE ROM EXERCISE  SBA WITH LE DRESSING                    Time Tracking:     OT Date of Treatment: 04/09/20  OT Start Time: 1055  OT Stop Time: 1120  OT Total Time (min): 25 min    Billable Minutes:Self Care/Home Management 10 minutes  Therapeutic Activity 15 minutes    Charmaine Alcantar OT  4/9/2020

## 2020-04-09 NOTE — DISCHARGE SUMMARY
[facility] Hospital Medicine  Discharge Summary      Patient Name: Eliud Frankel  MRN: [id number]  Admission Date: 4/5/2020  Hospital Length of Stay: 4 days  Discharge Date and Time:  04/09/2020 2:03 PM  Attending Physician: Alessandra Mccracken MD   Discharging Provider: Lore Ozuna NP  Primary Care Provider: Primary Doctor No      HPI:   Eliud Frankel is a 64 y.o. male patient with a PMHx of GERD, HLD, and HTN who presents to the Emergency Department for evaluation of fall from ladder of approximately 8 ft which onset suddenly just PTA. Symptoms are constant and moderate in severity. No mitigating or exacerbating factors reported. Associated sxs include right lower side/back pain. Patient denies any LOC, head injury/trauma, neck pain, CP, SOB, abd pain, fever, chills, numbness, weakness, n/v/d, and all other sxs at this time. Prior tx includes pain meds en route. In the ED, creatinine 1.6 otherwise labs unremarkable. CT spine revealed severely comminuted fracture involving the L1 vertebral body with associated decreased vertebral body height.  Large retropulsed fracture fragment causing moderate spinal stenosis.  There is also a vertical component extending through the lamina of L1 on the left. Extensive right nephrolithiasis. 8 mm stone involving the left renal pelvis. No definite hydronephrosis. Dr. Orlando was consulted in the ED, plans for surgery tomorrow. Hospital medicine called for admission.     Procedure(s) (LRB):  FUSION, SPINE, POSTERIOR SPINAL COLUMN, LUMBAR, USING COMPUTER-ASSISTED NAVIGATION   (Bilateral)      Hospital Course:   64 year old admitted for spinal fracture. CT showed L1 burst fx with 3 column injury. Neurosurgery consulted. MRI shows acute compression fracture of L1 with associated 30% loss of height.  Retropulsion of the dorsal cortex of L1 with moderate to severe central canal stenosis with probable distal cord/conus impingement.  Paraspinal soft tissue edema. As  of 4/6 pt s/p spinal fusion per Dr. Orlando today. Pt tolerated procedure well. Pain controlled on PCA pump. As of 4/7 POD #1, patient lying in bed. Pain controlled on PCA will transition to po pain meds. Reports back pain with movement only. PT/OT consult pending, further recommendations to follow. TLSO brace ordered. As of 4/8 pt had abdominal pain overnight, Abdominal  imaging reviewed concerning for ileus. CXR showed jess patchy infiltrates however appears to be atelectasis. WBCs normal, afebrile. PO levaquin started empirically. Pt encouraged to ambulate and use IS. Pt verbalized understanding. As of 4/9 pt is in good spirits. Overnight pt had two large bowel movements. Labs reviewed and stable. Discussed case with Dr. Orlando, ok to discharge from neurosurgery standpoint. Home health arranged with [facility] . Pt encouraged to continue IS use. Pt medically stable for discharge. Home meds reconciled. New rx given and delivered to bedside per [facility]. Pt to follow up with PCP in 3 days for hospital follow up. Pt also to follow up with Dr. Orlando for postop follow up and staple removal in 10 days. Pt seen and examined on the day of discharge and found suitable for discharge.          Consults:   Consults (From admission, onward)        Status Ordering Provider     Inpatient consult to Neurosurgery  Once     Provider:  Donaldo Orlando MD    Completed QI WAGONER     Inpatient consult to Social Work  Once     Provider:  (Not yet assigned)    Completed HUNTER GUERRERO          No new Assessment & Plan notes have been filed under this hospital service since the last note was generated.  Service: Hospital Medicine    Final Active Diagnoses:    Diagnosis Date Noted POA    PRINCIPAL PROBLEM:  Fall (on) (from) unspecified stairs and steps, initial encounter [W10.9XXA] 04/05/2020 Yes     Chronic    Atelectasis [J98.11] 04/08/2020 No    Hypertension [I10] 04/05/2020 Yes    Hyperlipidemia  [E78.5] 04/05/2020 Yes    GERD (gastroesophageal reflux disease) [K21.9] 04/05/2020 Yes      Problems Resolved During this Admission:    Diagnosis Date Noted Date Resolved POA    Ileus [K56.7] 04/08/2020 04/09/2020 No    GUSTABO (acute kidney injury) [N17.9] 04/05/2020 04/07/2020 Yes     Chronic       Discharged Condition: stable    Disposition: Home or Self Care    Follow Up:  Follow-up Information     [facility] [place].    Specialty:  Home Health Services  Why:  Home Health  Contact information:  [address]  [place] [address]  [phone]             Rome Mays PA-C. Schedule an appointment as soon as possible for a visit in 2 weeks.    Specialty:  Neurosurgery  Why:  For wound re-check, For suture/staple removal (unless Home Health able to do so in 2 weeks)  Contact information:  [id number] [place] [address] [address] [place] [address]  [phone]             Schedule an appointment as soon as possible for a visit in 3 days to follow up.    Why:  for hospital follow-up   Contact information:  F/U with PCP ay [facility] in [facility]                Patient Instructions:      Notify your health care provider if you experience any of the following:  temperature >100.4     Notify your health care provider if you experience any of the following:  severe uncontrolled pain     Notify your health care provider if you experience any of the following:  redness, tenderness, or signs of infection (pain, swelling, redness, odor or green/yellow discharge around incision site)     Notify your health care provider if you experience any of the following:  difficulty breathing or increased cough     Notify your health care provider if you experience any of the following:  persistent dizziness, light-headedness, or visual disturbances     Notify your health care provider if you experience any of the following:  increased confusion or weakness     Activity as tolerated        Significant Diagnostic Studies: Labs:   BMP:   Recent Labs   Lab 04/08/20  0440 04/09/20  0454     107 116*  116*     140 143  143   K 3.9  3.9 4.5  4.5     109 108  108   CO2 23  23 27  27   BUN 18  18 22  22   CREATININE 0.9  0.9 0.8  0.8   CALCIUM 8.0*  8.0* 8.4*  8.4*   , CMP   Recent Labs   Lab 04/08/20  0440 04/09/20  0454     140 143  143   K 3.9  3.9 4.5  4.5     109 108  108   CO2 23  23 27  27     107 116*  116*   BUN 18  18 22  22   CREATININE 0.9  0.9 0.8  0.8   CALCIUM 8.0*  8.0* 8.4*  8.4*   ANIONGAP 8  8 8  8   ESTGFRAFRICA >60  >60 >60  >60   EGFRNONAA >60  >60 >60  >60    and CBC   Recent Labs   Lab 04/08/20 0440 04/09/20  0454   WBC 10.92  10.92 11.29  11.29   HGB 10.5*  10.5* 11.1*  11.1*   HCT 32.0*  32.0* 34.8*  34.8*   *  132* 158  158       Pending Diagnostic Studies:     None         Medications:  Reconciled Home Medications:      Medication List      START taking these medications    docusate sodium 100 MG capsule  Commonly known as:  COLACE  Take 1 capsule (100 mg total) by mouth once daily. for 14 days     levoFLOXacin 750 MG tablet  Commonly known as:  LEVAQUIN  Take 1 tablet (750 mg total) by mouth once daily. for 6 days  Start taking on:  April 10, 2020     methocarbamoL 750 MG Tab  Commonly known as:  ROBAXIN  Take 1 tablet (750 mg total) by mouth 3 (three) times daily. for 10 days     nozaseptin  nasal   Commonly known as:  NOZIN  1 each by Each Nostril route 2 (two) times daily.     oxyCODONE-acetaminophen  mg per tablet  Commonly known as:  PERCOCET  Take 1 tablet by mouth every 8 (eight) hours as needed for Pain.        CONTINUE taking these medications    amLODIPine 10 MG tablet  Commonly known as:  NORVASC  Take 10 mg by mouth.     atorvastatin 20 MG tablet  Commonly known as:  LIPITOR  Take 20 mg by mouth.     lisinopriL 40 MG tablet  Commonly known as:   PRINIVIL,ZESTRIL  Take 40 mg by mouth.     nebivoloL 10 MG Tab  Commonly known as:  BYSTOLIC  Take 10 mg by mouth.     pantoprazole 40 MG tablet  Commonly known as:  PROTONIX  Take 40 mg by mouth.            Indwelling Lines/Drains at time of discharge:   Lines/Drains/Airways     None                 Time spent on the discharge of patient: 60 minutes  Patient was seen and examined on the date of discharge and determined to be suitable for discharge.         Lore Ozuna NP  Department of Hospital Medicine  [facility]

## 2020-04-09 NOTE — PLAN OF CARE
Problem: Adult Inpatient Plan of Care  Goal: Plan of Care Review  Outcome: Ongoing, Progressing     Fall precautions implemented. Pt remained free from falls/injuries.  IVF infusing per orders. Frequent weight shifting encouraged.  Pain adequately controlled with PO PRN meds. TLSO brace on when out of bed.  Pt had two moderate amount of liquid stools post enema administration. Surgical incision to back intact. Safety precautions implemented. Chart reviewed. Will continue to monitor.

## 2020-04-09 NOTE — NURSING
"Secure chat sent to hospital medicine stating "POD #3  spinal fusion of T11-L3 pt c/o constipation LBM 04/05/20 believes this is where his pain is coming from. Received daily PRN bisacodyl and magnesium hydroxide without relief. offered to give PRN senna but pt refused stating he tried it previously and it didn't work. requesting enema please advise". Orders received per PINKY Aguirre NP to administer soap suds enema.  "

## 2020-04-09 NOTE — PLAN OF CARE
Accepted by [facility]        04/09/20 1424   Post-Acute Status   Post-Acute Authorization Home Health   Home Health Status Set-up Complete

## 2020-04-09 NOTE — PROGRESS NOTES
[facility]  Neurosurgery  Progress Note    Subjective:     History of Present Illness: No notes on file    Post-Op Info:  Procedure(s) (LRB):  FUSION, SPINE, POSTERIOR SPINAL COLUMN, LUMBAR, USING COMPUTER-ASSISTED NAVIGATION   (Bilateral)   3 Days Post-Op   POD # 3   Ambulated with PT yest   No weakness   Back pain controlled with meds   Tolerating brace   tonya diet   BM x 2 yest     Vitals reviewed   MAEW   Incision cdi   Dressing in place     Assessment/Plan:     Patient would like to go home with home health PT   Will arrange this morning   Ok to d/c to home will f/u in 10 days for staple removal       Donaldo Orlando MD  Neurosurgery  [facility]

## 2020-04-10 PROCEDURE — G0180 PR HOME HEALTH MD CERTIFICATION: ICD-10-PCS | Mod: ,,, | Performed by: NEUROLOGICAL SURGERY

## 2020-04-10 PROCEDURE — G0180 MD CERTIFICATION HHA PATIENT: HCPCS | Mod: ,,, | Performed by: NEUROLOGICAL SURGERY

## 2020-04-14 NOTE — PLAN OF CARE
04/14/20 1546   Final Note   Assessment Type Final Discharge Note   Anticipated Discharge Disposition Home-Health   Right Care Referral Info   Post Acute Recommendation Home-care   Facility Name [facility]

## 2020-04-15 NOTE — OP NOTE
[facility]   Neurosurgery  Operative Note    SUMMARY      Date of Procedure: 4/6/2020     Procedure: Procedure(s) (LRB):  FUSION, SPINE, POSTERIOR SPINAL COLUMN, LUMBAR, USING COMPUTER-ASSISTED NAVIGATION   (Bilateral)     Surgeon(s) and Role:     * Donaldo Orlando MD - Primary    Assisting Surgeon: None    Pre-Operative Diagnosis: Closed burst fracture of lumbar vertebra, initial encounter [S32.001A]    Post-Operative Diagnosis: Post-Op Diagnosis Codes:     * Closed burst fracture of lumbar vertebra, initial encounter [S32.001A]    Anesthesia: General    Technical Procedures Used:   Placement of pedicle screws bilaterally T11-L3   Posterior arthrodesis T11-L3    partial laminectomy L1   Reduction of burst fracture L 1   Use of stereotactic navigation   Use of allograft bone   Use of autograft bone     Description of the Findings of the Procedure:   Burst fracture L 1     Complications: No    Estimated Blood Loss (EBL): 600 mL           Specimens:   Specimen (12h ago, onward)    None           Implants:   Implant Name Type Inv. Item Serial No.  Lot No. LRB No. Used   KIT BONE GRFT BMP SM - SN/A  KIT BONE GRFT BMP SM N/A [employer] [place] [id number] N/A 1   [id number] Bone  [id number] [employer]-NEURO N/A N/A 1   SCREW HORIZON SOLERA 6.5X40 - SN/A  SCREW HORIZON SOLERA 6.5X40 N/A [employer] [place] N/A N/A 4   6.5 x 45    N/A [employer]-NEURO N/A N/A 4   5.5 x 35   N/A [employer]-NEURO N/A N/A 1   39mm CROSSLINK   N/A [employer]-NEURO N/A N/A 1   500mm JOSE   N/A [employer]-NEURO N/A N/A 2   SET SCREW HORIZON SOLERA 5.5-6 - SN/A  SET SCREW HORIZON SOLERA 5.5-6 N/A [employer] [place] N/A N/A 9              Condition: Good    Disposition: PACU - hemodynamically stable.    Attestation: I was present and scrubbed for the entire procedure.     Patient is a 64-year-old male who presented following a fall from a ladder.   On route to the ER is complaining of lower back pain x-ray revealed L1 fracture.  CT scan and  subsequent MRI revealed a burst fracture through L1 with posterior displacement of bone fragment with cord compression noted.  Patient had noted neurogenic bladder and Mckinley catheter was placed.     After the imaging was complete the patient I discuss with the patient and the patient's father and we discussed treatment options the did agree elect to undergo a posterior fusion  with stabilization from T11-L3 along with reduction of the fracture      Risks were discussed including pain infection bleeding, paralysis and death.  Failure of this operation to relieve all of her symptoms.  Continue neurogenic bladder.  Failure of hardware considering  with further operative procedures needed.  CSF leak.  Worsened condition.     Description of procedure:  The patient was transferred to the operating room and was given preoperative prophylactic IV antibiotics.  The Anesthesia Service sedated and intubated the patient.  The eyes were taped shut after ointment was applied to prevent corneal abrasion.  A Dank Hugger was placed over the upper body to maintain control of the core body temperature.  Mckinley catheter was inserted.  The patient was turned prone on the Moises table.  All pressure points were carefully padded.  The electrophysiology monitoring team inserted needles in their proper locations.     Operative Technique:  The patient was prepped and draped in the standard sterile fashion.  The C-arm fluoroscopy was draped and brought into the operative field and the T11-L3  levels were identified.  Local anesthetic was infiltrated midline at this level.  The skin was subsequently opened sharply with a #10 blade.  Dissection was carried down superiorly and inferiorly in the midline to expose the supraspinous ligament and the lamina.  The musculature was elevated subperiosteally to expose the facets on the bilaterally with the Bovie electrocautery as well as the Venegas elevator.  Hemostasis was achieved.  Self retraining  retractors were then inserted.     Next the stereotactic reference frame was fastened to the spinous process at the level across the fracture level.   Next the O-Arm scanner was brought and and images were obtained. Once registration was complete.  The navigated drill was used to make a  hole down the pedicle. Next the pedicle screws were placed bilaterally using stereotactic navigation at T11-L3 levels.  Medtronic Solera 5.5 x 40 mm  screws bilaterally at T11 and T12.  6.5 x 45 mm used at L2 and L3. A unilateral screw was placed at L1  6.5 x 35. Mm.  Once all the screws were in place each screw was stimulated. All of the screws stimulated >20 mV.       At this point, a high-speed drill was used to remove the spinous process and partial lamina, at the L1 level. This bone was set aside for autograft later.     Next the inter screw distance was measured and a rosalinda was placed bilaterally into the poly axial screws.  Using distraction the vertebral height was restored and the bone fragment as well as the fracture was reduced   An intraoperative o-arm spin confirmed the fracture fragment was reduced      Inner threaded caps were secured to the polyaxial screws to ensure fixation of the rods.   I secured the inner threaded caps to 80 feet/pounds with the final torquing wrench.      Next using a Venegas elevator the transverse processes bilaterally were exposed and decorticated.  The remaining bone chips from the patient's saved laminectomy bone along with 10 cc of DBM was placed laterally from T11-L3 for the posterolateral fusion with allograft bone chips after being processed in the bone mill.       The wound was copiously irrigated with antibiotic saline solution.  A 10 flat LEAH drain was placed and brought out through a separate stab incision. 1 g of vancomycin powder was placed to the surgical cavity.  The fascia was subsequently closed utilizing 0 Vicryl sutures.  Exparel was injected into the muscle for  postoperative pain control.  The subcuticular layer was closed with a 2 0 Vicryl in an inverted fashion.  The skin was then approximated with a 3-0 nylon in a running locked fashion.  The incision was dressed in a clean and dry dressing.     All sponge counts, needle counts and instrument counts were correct at the end of the case x 2.  Neuro monitoring motors, SSEP, and EMG remained stable throughout the entire procedure. The patient tolerated the procedure well without any apparent complications and was transferred in a stable condition to the recovery room.

## 2020-04-16 ENCOUNTER — EXTERNAL HOME HEALTH (OUTPATIENT)
Dept: HOME HEALTH SERVICES | Facility: HOSPITAL | Age: 65
End: 2020-04-16
Payer: COMMERCIAL

## 2020-04-16 RX ORDER — OXYCODONE AND ACETAMINOPHEN 10; 325 MG/1; MG/1
1 TABLET ORAL EVERY 8 HOURS PRN
Qty: 30 TABLET | Refills: 0 | Status: SHIPPED | OUTPATIENT
Start: 2020-04-16 | End: 2020-05-04 | Stop reason: SDUPTHER

## 2020-04-16 NOTE — TELEPHONE ENCOUNTER
----- Message from Holger Melendez sent at 4/16/2020 12:50 PM CDT -----  ..Type:  RX Refill Request    Who Called:  Elizabeth ( pt wife)   Refill or New Rx: refill   RX Name and Strength: oxycodone 325 mg   How is the patient currently taking it? (ex. 1XDay) daily  Is this a 30 day or 90 day RX: 30  Preferred Pharmacy with phone number . [facility] in [place]   Local or Mail Order: local   Ordering Provider:  Would the patient rather a call back or a response via [facility]? Call back   Best Call Back Number: [phone] ( home or [phone]  Additional Information:

## 2020-04-17 NOTE — TELEPHONE ENCOUNTER
----- Message from Holger Melendez sent at 4/17/2020  8:26 AM CDT -----  ..Type:  Patient Returning Call    Who Called: Elizabeth ( pt wife )   Who Left Message for Patient:  Does the patient know what this is regarding?: pain medication   Would the patient rather a call back or a response via [facility]?call back   Best Call Back Number: [phone] ( home ) or [phone]  Additional Information: Elizabeth ( pt wife ) is requesting a call from nurse to f/u on the pt pain medication request .

## 2020-04-18 RX ORDER — OXYCODONE AND ACETAMINOPHEN 10; 325 MG/1; MG/1
1 TABLET ORAL EVERY 8 HOURS PRN
Qty: 30 TABLET | Refills: 0 | Status: CANCELLED | OUTPATIENT
Start: 2020-04-18

## 2020-04-21 ENCOUNTER — PATIENT MESSAGE (OUTPATIENT)
Dept: NEUROSURGERY | Facility: CLINIC | Age: 65
End: 2020-04-21

## 2020-04-21 ENCOUNTER — TELEPHONE (OUTPATIENT)
Dept: NEUROSURGERY | Facility: CLINIC | Age: 65
End: 2020-04-21

## 2020-04-21 NOTE — TELEPHONE ENCOUNTER
Spoke with pt in reference to the message below, pt stated he has radiating pain from the area above buttocks on the right side radiating down to inner right leg, informed pt I will pass this information on, informed pt medication has been refilled and is at [facility], offered to have it filled where ever pt would like, stated, no, where it is, is fine, pt is aware homehealth will take staples out on Thursday and pt will have f/u appt telemed on Monday with Rome//ns        ----- Message from Martinez Milton sent at 4/21/2020 10:57 AM CDT -----  Contact: Juan Frankel   Would like to schedule post op f/u      [phone]

## 2020-04-27 ENCOUNTER — OFFICE VISIT (OUTPATIENT)
Dept: NEUROSURGERY | Facility: CLINIC | Age: 65
End: 2020-04-27
Payer: COMMERCIAL

## 2020-04-27 ENCOUNTER — PATIENT MESSAGE (OUTPATIENT)
Dept: NEUROSURGERY | Facility: CLINIC | Age: 65
End: 2020-04-27

## 2020-04-27 DIAGNOSIS — S32.001D CLOSED BURST FRACTURE OF LUMBAR VERTEBRA WITH ROUTINE HEALING, SUBSEQUENT ENCOUNTER: ICD-10-CM

## 2020-04-27 DIAGNOSIS — Z98.1 S/P LUMBAR FUSION: ICD-10-CM

## 2020-04-27 DIAGNOSIS — Z98.1 S/P FUSION OF THORACIC SPINE: ICD-10-CM

## 2020-04-27 DIAGNOSIS — Z09 POSTOP CHECK: Primary | ICD-10-CM

## 2020-04-27 PROCEDURE — 99024 POSTOP FOLLOW-UP VISIT: CPT | Mod: 95,,, | Performed by: PHYSICIAN ASSISTANT

## 2020-04-27 PROCEDURE — 99024 PR POST-OP FOLLOW-UP VISIT: ICD-10-PCS | Mod: 95,,, | Performed by: PHYSICIAN ASSISTANT

## 2020-04-27 NOTE — PROGRESS NOTES
"Subjective:      Patient ID: Eliud Frankel is a 65 y.o. male.    Chief Complaint: No chief complaint on file.    HPI     The patient location is: Home  The chief complaint leading to consultation is: postop check  Visit type: audiovisual  Total time spent with patient: 30 min  Each patient to whom he or she provides medical services by telemedicine is:  (1) informed of the relationship between the physician and patient and the respective role of any other health care provider with respect to management of the patient; and (2) notified that he or she may decline to receive medical services by telemedicine and may withdraw from such care at any time.    Notes: Patient thinks that he is doing great so far. He does not have "a lot of pain in the back area" but he does have some discomfort to the R side- above his buttocks. From his Left knee to groin area and also in R groin area he is experiencing some pain, feels sore but also sharp at times; this is felt whenever he is walking. The pain in this area has improved since surgery according to the patient. He denies any bladder/bowel issues.     He does have home health and physical therapy as well.   Denies headaches, dizziness, vomiting, incision drainage.      Date of Procedure: 4/6/2020    Procedure: Procedure(s) (LRB):  FUSION, SPINE, POSTERIOR SPINAL COLUMN, LUMBAR, USING COMPUTER-ASSISTED NAVIGATION   (Bilateral)    Pre-Operative Diagnosis: Closed burst fracture of lumbar vertebra, initial encounter [S32.001A]   Post-Operative Diagnosis: Post-Op Diagnosis Codes:     * Closed burst fracture of lumbar vertebra, initial encounter [S32.001A]   Technical Procedures Used:   Placement of pedicle screws bilaterally T11-L3   Posterior arthrodesis T11-L3    partial laminectomy L1   Reduction of burst fracture L 1   Use of stereotactic navigation   Use of allograft bone   Use of autograft bone    Description of the Findings of the Procedure:   Burst fracture L 1 "     Review of Systems   Constitution: Negative for fever.   HENT: Negative for congestion.    Respiratory: Negative for cough and wheezing.    Skin: Negative for poor wound healing.   Musculoskeletal: Positive for muscle cramps and myalgias. Negative for back pain, falls, joint pain, muscle weakness and neck pain.   Gastrointestinal: Negative for abdominal pain, bowel incontinence, diarrhea, nausea and vomiting.   Genitourinary: Negative for bladder incontinence.   Neurological: Negative for numbness and seizures.   Psychiatric/Behavioral: Negative for altered mental status.         Objective:            General    Constitutional: He is oriented to person, place, and time. He appears well-developed and well-nourished.   Neck: Normal range of motion.   Cardiovascular: Normal rate and regular rhythm.    Pulmonary/Chest: Effort normal.   Abdominal: Soft.   Neurological: He is alert and oriented to person, place, and time.   Psychiatric: He has a normal mood and affect. His behavior is normal.           Neurosurgery exam:  Patient alert and oriented x3  Incision CDI  No swelling or fluctuance  Staples removed by home health nurse last week  Moves all 4 extremities                  Assessment:       Encounter Diagnoses   Name Primary?    Postop check Yes    S/P lumbar fusion     S/P fusion of thoracic spine     Closed burst fracture of lumbar vertebra with routine healing, subsequent encounter           Plan:       Diagnoses and all orders for this visit:    Postop check  -     X-Ray Thoracolumbar Spine AP Lateral; Future    S/P lumbar fusion  -     X-Ray Thoracolumbar Spine AP Lateral; Future    S/P fusion of thoracic spine  -     X-Ray Thoracolumbar Spine AP Lateral; Future    Closed burst fracture of lumbar vertebra with routine healing, subsequent encounter  -     X-Ray Thoracolumbar Spine AP Lateral; Future        Pt will continue with home health/physical therapy. Whenever he completes this he can transition to  outpatient therapy.  Continue use of TLSO.  Follow-up in 3 weeks with x-rays.          Rome Mays PA-C

## 2020-05-04 ENCOUNTER — TELEPHONE (OUTPATIENT)
Dept: NEUROSURGERY | Facility: CLINIC | Age: 65
End: 2020-05-04

## 2020-05-04 RX ORDER — OXYCODONE AND ACETAMINOPHEN 10; 325 MG/1; MG/1
1 TABLET ORAL EVERY 8 HOURS PRN
Qty: 30 TABLET | Refills: 0 | Status: SHIPPED | OUTPATIENT
Start: 2020-05-04 | End: 2020-05-26 | Stop reason: SDUPTHER

## 2020-05-04 NOTE — TELEPHONE ENCOUNTER
Last office visit 4/27/20 mita/ SRUTHI Mays.       ----- Message from Janay S Jose Maria sent at 5/4/2020 10:16 AM CDT -----  Contact: Pt self Home [phone]  Requesting an RX refill or new RX.  Is this a refill or new RX:  Refill  RX name and strength: oxyCODONE-acetaminophen (PERCOCET)  mg per tablet  Directions (copy/paste from chart):  N/A  Is this a 30 day or 90 day RX:  N/A  Local pharmacy or mail order pharmacy:  [facility] [address] [place] - [address] [facility]  Pharmacy name and phone # [phone] Fax# [phone]   Comment: Patient said that he usually get a two week supply of this script.     Requesting an RX refill or new RX.  Is this a refill or new RX:  Refill  RX name and strength: Methocardamol 750 mg   Directions (copy/paste from chart):  N/A  Is this a 30 day or 90 day RX:  N/A  Local pharmacy or mail order pharmacy:  [facility] [address]  [place], [place] - [address] [facility]  Pharmacy name and phone # [phone] Fax# [phone]   Comments:  Patient said that he usually get a ten day supply. Patient would like for you to give him a call so that he can ask you some questions please.

## 2020-05-05 DIAGNOSIS — Z98.1 S/P LUMBAR FUSION: Primary | ICD-10-CM

## 2020-05-05 RX ORDER — METHOCARBAMOL 750 MG/1
750 TABLET, FILM COATED ORAL 3 TIMES DAILY
Qty: 40 TABLET | Refills: 0 | Status: SHIPPED | OUTPATIENT
Start: 2020-05-05 | End: 2020-05-26 | Stop reason: SDUPTHER

## 2020-05-05 RX ORDER — METHOCARBAMOL 750 MG/1
750 TABLET, FILM COATED ORAL
COMMUNITY
Start: 2020-04-18 | End: 2020-05-05 | Stop reason: SDUPTHER

## 2020-05-05 NOTE — TELEPHONE ENCOUNTER
----- Message from Tamar Vang sent at 5/5/2020 11:03 AM CDT -----  Contact: pt  .Type:  RX Refill Request    Who Called: pt   Refill or New Rx: refill   RX Name and Strength: muscle relaxer (robaxin)  How is the patient currently taking it? (ex. 1XDay):   Is this a 30 day or 90 day RX:   Preferred Pharmacy with phone number:       [facility] [address] [place], [place] - [address]  [address]  [place] [address]  Phone: [phone] Fax: [phone]      Local or Mail Order: local   Ordering Provider:   Would the patient rather a call back or a response via [facility]? Call back   Best Call Back Number: [phone] (home)   Additional Information:  Pt stated Rx was not called in , pt wants to know if he's being taken off medication

## 2020-05-05 NOTE — TELEPHONE ENCOUNTER
----- Message from Tamar Vang sent at 5/5/2020 11:03 AM CDT -----  Contact: pt  .Type:  RX Refill Request    Who Called: pt   Refill or New Rx: refill   RX Name and Strength: muscle relaxer (robaxin)  How is the patient currently taking it? (ex. 1XDay):   Is this a 30 day or 90 day RX:   Preferred Pharmacy with phone number:       [facility] [address] [place], [place] - [address]  [address]  [place] [address]  Phone: [phone] Fax: [phone]      Local or Mail Order: local   Ordering Provider:   Would the patient rather a call back or a response via [facility]? Call back   Best Call Back Number: [phone] (home)   Additional Information:  Pt stated Rx was not called in , pt wants to know if he's being taken off medication

## 2020-05-19 ENCOUNTER — OFFICE VISIT (OUTPATIENT)
Dept: NEUROSURGERY | Facility: CLINIC | Age: 65
End: 2020-05-19
Payer: COMMERCIAL

## 2020-05-19 ENCOUNTER — HOSPITAL ENCOUNTER (OUTPATIENT)
Dept: RADIOLOGY | Facility: HOSPITAL | Age: 65
Discharge: HOME | End: 2020-05-19
Attending: PHYSICIAN ASSISTANT
Payer: COMMERCIAL

## 2020-05-19 VITALS
WEIGHT: 268.94 LBS | DIASTOLIC BLOOD PRESSURE: 101 MMHG | SYSTOLIC BLOOD PRESSURE: 160 MMHG | BODY MASS INDEX: 36.43 KG/M2 | HEART RATE: 73 BPM | HEIGHT: 72 IN

## 2020-05-19 DIAGNOSIS — Z09 POSTOP CHECK: ICD-10-CM

## 2020-05-19 DIAGNOSIS — Z98.1 S/P LUMBAR FUSION: Primary | ICD-10-CM

## 2020-05-19 DIAGNOSIS — S32.001D CLOSED BURST FRACTURE OF LUMBAR VERTEBRA WITH ROUTINE HEALING, SUBSEQUENT ENCOUNTER: ICD-10-CM

## 2020-05-19 DIAGNOSIS — Z98.1 S/P FUSION OF THORACIC SPINE: ICD-10-CM

## 2020-05-19 DIAGNOSIS — Z98.1 S/P LUMBAR FUSION: ICD-10-CM

## 2020-05-19 PROCEDURE — 72080 X-RAY EXAM THORACOLMB 2/> VW: CPT | Mod: 26,,, | Performed by: RADIOLOGY

## 2020-05-19 PROCEDURE — 72080 XR THORACOLUMBAR SPINE AP LATERAL: ICD-10-PCS | Mod: 26,,, | Performed by: RADIOLOGY

## 2020-05-19 PROCEDURE — 72080 X-RAY EXAM THORACOLMB 2/> VW: CPT | Mod: TC

## 2020-05-19 PROCEDURE — 99024 PR POST-OP FOLLOW-UP VISIT: ICD-10-PCS | Mod: S$GLB,,, | Performed by: NEUROLOGICAL SURGERY

## 2020-05-19 PROCEDURE — 99999 PR PBB SHADOW E&M-EST. PATIENT-LVL IV: ICD-10-PCS | Mod: PBBFAC,,, | Performed by: NEUROLOGICAL SURGERY

## 2020-05-19 PROCEDURE — 99024 POSTOP FOLLOW-UP VISIT: CPT | Mod: S$GLB,,, | Performed by: NEUROLOGICAL SURGERY

## 2020-05-19 PROCEDURE — 99999 PR PBB SHADOW E&M-EST. PATIENT-LVL IV: CPT | Mod: PBBFAC,,, | Performed by: NEUROLOGICAL SURGERY

## 2020-05-25 ENCOUNTER — PATIENT MESSAGE (OUTPATIENT)
Dept: NEUROSURGERY | Facility: CLINIC | Age: 65
End: 2020-05-25

## 2020-05-25 DIAGNOSIS — Z98.1 S/P LUMBAR FUSION: ICD-10-CM

## 2020-05-26 DIAGNOSIS — Z98.1 S/P LUMBAR FUSION: ICD-10-CM

## 2020-05-27 ENCOUNTER — PATIENT MESSAGE (OUTPATIENT)
Dept: NEUROSURGERY | Facility: CLINIC | Age: 65
End: 2020-05-27

## 2020-05-27 RX ORDER — OXYCODONE AND ACETAMINOPHEN 10; 325 MG/1; MG/1
1 TABLET ORAL EVERY 8 HOURS PRN
Qty: 30 TABLET | Refills: 0 | Status: CANCELLED | OUTPATIENT
Start: 2020-05-27

## 2020-05-27 RX ORDER — METHOCARBAMOL 750 MG/1
750 TABLET, FILM COATED ORAL 3 TIMES DAILY
Qty: 40 TABLET | Refills: 0 | Status: CANCELLED | OUTPATIENT
Start: 2020-05-27

## 2020-07-06 ENCOUNTER — TELEPHONE (OUTPATIENT)
Dept: NEUROSURGERY | Facility: CLINIC | Age: 65
End: 2020-07-06

## 2020-07-08 ENCOUNTER — HOSPITAL ENCOUNTER (OUTPATIENT)
Dept: RADIOLOGY | Facility: HOSPITAL | Age: 65
Discharge: HOME | End: 2020-07-08
Attending: NEUROLOGICAL SURGERY
Payer: COMMERCIAL

## 2020-07-08 ENCOUNTER — OFFICE VISIT (OUTPATIENT)
Dept: NEUROSURGERY | Facility: CLINIC | Age: 65
End: 2020-07-08
Payer: COMMERCIAL

## 2020-07-08 VITALS
RESPIRATION RATE: 18 BRPM | DIASTOLIC BLOOD PRESSURE: 86 MMHG | BODY MASS INDEX: 37.95 KG/M2 | HEIGHT: 72 IN | WEIGHT: 280.19 LBS | SYSTOLIC BLOOD PRESSURE: 131 MMHG | HEART RATE: 83 BPM

## 2020-07-08 DIAGNOSIS — Z98.1 S/P LUMBAR FUSION: Primary | ICD-10-CM

## 2020-07-08 DIAGNOSIS — S32.001D CLOSED BURST FRACTURE OF LUMBAR VERTEBRA WITH ROUTINE HEALING, SUBSEQUENT ENCOUNTER: ICD-10-CM

## 2020-07-08 DIAGNOSIS — Z09 POSTOP CHECK: ICD-10-CM

## 2020-07-08 DIAGNOSIS — M43.27 FUSION OF SPINE, LUMBOSACRAL REGION: ICD-10-CM

## 2020-07-08 DIAGNOSIS — Z98.1 S/P LUMBAR FUSION: ICD-10-CM

## 2020-07-08 PROCEDURE — 99999 PR PBB SHADOW E&M-EST. PATIENT-LVL III: ICD-10-PCS | Mod: PBBFAC,,, | Performed by: PHYSICIAN ASSISTANT

## 2020-07-08 PROCEDURE — 99024 PR POST-OP FOLLOW-UP VISIT: ICD-10-PCS | Mod: S$GLB,,, | Performed by: PHYSICIAN ASSISTANT

## 2020-07-08 PROCEDURE — 72080 XR THORACOLUMBAR SPINE AP LATERAL: ICD-10-PCS | Mod: 26,,, | Performed by: RADIOLOGY

## 2020-07-08 PROCEDURE — 99024 POSTOP FOLLOW-UP VISIT: CPT | Mod: S$GLB,,, | Performed by: PHYSICIAN ASSISTANT

## 2020-07-08 PROCEDURE — 72080 X-RAY EXAM THORACOLMB 2/> VW: CPT | Mod: TC

## 2020-07-08 PROCEDURE — 72080 X-RAY EXAM THORACOLMB 2/> VW: CPT | Mod: 26,,, | Performed by: RADIOLOGY

## 2020-07-08 PROCEDURE — 99999 PR PBB SHADOW E&M-EST. PATIENT-LVL III: CPT | Mod: PBBFAC,,, | Performed by: PHYSICIAN ASSISTANT

## 2020-07-08 RX ORDER — OXYCODONE AND ACETAMINOPHEN 5; 325 MG/1; MG/1
1 TABLET ORAL EVERY 8 HOURS PRN
Qty: 30 TABLET | Refills: 0 | Status: SHIPPED | OUTPATIENT
Start: 2020-07-08 | End: 2021-07-29 | Stop reason: ALTCHOICE

## 2020-07-08 NOTE — PROGRESS NOTES
Subjective:      Patient ID: Eliud Frankel is a 65 y.o. male.    Chief Complaint: Post-op Evaluation (#3  Lumbar Fusion (TLIF)  4/6/20 )    HPI    Patient is here today for postop evaluation #3.  Patient feels much relief since his surgery.  He has occasional back pain but overall not worse than before surgery.  He has some inner thigh aching on both sides of his legs especially in the morning.   Denies any pain radiating down his legs.  He does have spasms and twitching in legs.   No issues w/ bladder/bowel function.  Rates his pain 2/10 today.    He has a best friend who is a physical therapist and is doing at home exercises directed by this person.      Date of Procedure: 4/6/2020    Procedure: Procedure(s) (LRB):  FUSION, SPINE, POSTERIOR SPINAL COLUMN, LUMBAR, USING COMPUTER-ASSISTED NAVIGATION   (Bilateral)    Pre-Operative Diagnosis: Closed burst fracture of lumbar vertebra, initial encounter [S32.001A]   Post-Operative Diagnosis: Post-Op Diagnosis Codes:     * Closed burst fracture of lumbar vertebra, initial encounter [S32.001A]   Technical Procedures Used:   Placement of pedicle screws bilaterally T11-L3   Posterior arthrodesis T11-L3    partial laminectomy L1   Reduction of burst fracture L 1   Use of stereotactic navigation   Use of allograft bone   Use of autograft bone    Description of the Findings of the Procedure:   Burst fracture L 1         Review of Systems   Constitution: Negative for fever.   HENT: Negative for congestion.    Respiratory: Negative for cough and wheezing.    Skin: Negative for poor wound healing.   Musculoskeletal: Positive for back pain and muscle cramps. Negative for falls.   Gastrointestinal: Negative for abdominal pain, bowel incontinence, diarrhea, nausea and vomiting.   Genitourinary: Negative for bladder incontinence.   Neurological: Negative for numbness and seizures.   Psychiatric/Behavioral: Negative for altered mental status.         Objective:             General    Constitutional: He is oriented to person, place, and time. He appears well-developed and well-nourished.   Neck: Normal range of motion.   Cardiovascular: Normal rate and regular rhythm.    Pulmonary/Chest: Effort normal.   Abdominal: Soft.   Neurological: He is alert and oriented to person, place, and time.   Psychiatric: He has a normal mood and affect. His behavior is normal.         Back (L-Spine & T-Spine) / Neck (C-Spine) Exam     Comments:  ROM - not tested    Incision on back is well healed. No erythema, swelling or fluctuance.    Moves all 4 extremities.    Sensation intact to light touch.              Imaging:  Details    Reading Physician Reading Date Result Priority   Elvin Sargent MD  [phone] 7/8/2020 Routine      Narrative & Impression     EXAMINATION:  XR THORACOLUMBAR SPINE AP LATERAL     CLINICAL HISTORY:  S/P fusion of thoracic spine;Arthrodesis status     TECHNIQUE:  AP and lateral views of the thoracolumbar spine were performed.     COMPARISON:  05/19/2020     FINDINGS:  Stable posterior spinal fusion hardware spanning T11 to L3 levels, unchanged from the prior exam.  Unchanged fracture deformity of L1.  Mild degenerative changes.  Atherosclerosis.  Overall, no substantial change since the comparison study.     Impression:     As above             Assessment:       Encounter Diagnoses   Name Primary?    Fusion of spine, lumbosacral region     S/P lumbar fusion Yes    Postop check     Closed burst fracture of lumbar vertebra with routine healing, subsequent encounter           Plan:       Eliud was seen today for post-op evaluation.    Diagnoses and all orders for this visit:    S/P lumbar fusion    Fusion of spine, lumbosacral region  -     CT Lumbar Spine Without Contrast; Future    Postop check    Closed burst fracture of lumbar vertebra with routine healing, subsequent encounter    Other orders  -     oxyCODONE-acetaminophen (PERCOCET) 5-325 mg per tablet; Take 1  tablet by mouth every 8 (eight) hours as needed for Pain.        Patient will follow-up in 3 months for follow-up.   Will get CT to assess for fusion.  Discussed w/ patient weaning off of pain medications. He is currently taking 2 1/2 tablets of Percocet 10.   Rx sent today for Percocet 5. Pt aware to take only as needed.             Rome Mays PA-C

## 2020-07-27 ENCOUNTER — TELEPHONE (OUTPATIENT)
Dept: NEUROSURGERY | Facility: CLINIC | Age: 65
End: 2020-07-27

## 2020-07-27 RX ORDER — OXYCODONE AND ACETAMINOPHEN 5; 325 MG/1; MG/1
1 TABLET ORAL EVERY 8 HOURS PRN
Qty: 30 TABLET | Refills: 0 | Status: CANCELLED | OUTPATIENT
Start: 2020-07-27

## 2020-07-27 NOTE — TELEPHONE ENCOUNTER
Pt stated he needs his script refilled due to pain. I informed the pt that he is outside his 3 month post op from surgery on 4/6/2020. Pt stated The SRUTHI dicussed with him keeping him on the medication and lowering his dose. I did review the SRUTHI last note which states weaning pat off pain meds. I asked th pt would he like a earlier appt in which he denied. I told the pt I will pass on his message to the SRUTHI Mays. Pt verbalized understanding of our call.

## 2020-10-08 ENCOUNTER — HOSPITAL ENCOUNTER (OUTPATIENT)
Dept: RADIOLOGY | Facility: HOSPITAL | Age: 65
Discharge: HOME | End: 2020-10-08
Attending: PHYSICIAN ASSISTANT
Payer: COMMERCIAL

## 2020-10-08 DIAGNOSIS — M43.27 FUSION OF SPINE, LUMBOSACRAL REGION: ICD-10-CM

## 2020-10-08 PROCEDURE — 72131 CT LUMBAR SPINE WITHOUT CONTRAST: ICD-10-PCS | Mod: 26,,, | Performed by: RADIOLOGY

## 2020-10-08 PROCEDURE — 72131 CT LUMBAR SPINE W/O DYE: CPT | Mod: TC

## 2020-10-08 PROCEDURE — 72131 CT LUMBAR SPINE W/O DYE: CPT | Mod: 26,,, | Performed by: RADIOLOGY

## 2020-10-13 ENCOUNTER — OFFICE VISIT (OUTPATIENT)
Dept: NEUROSURGERY | Facility: CLINIC | Age: 65
End: 2020-10-13
Payer: COMMERCIAL

## 2020-10-13 VITALS
RESPIRATION RATE: 16 BRPM | WEIGHT: 276.88 LBS | DIASTOLIC BLOOD PRESSURE: 104 MMHG | HEART RATE: 70 BPM | BODY MASS INDEX: 37.5 KG/M2 | SYSTOLIC BLOOD PRESSURE: 160 MMHG | HEIGHT: 72 IN

## 2020-10-13 DIAGNOSIS — Z98.1 S/P LUMBAR FUSION: Primary | ICD-10-CM

## 2020-10-13 DIAGNOSIS — Z09 POSTOP CHECK: ICD-10-CM

## 2020-10-13 PROCEDURE — 3008F BODY MASS INDEX DOCD: CPT | Mod: CPTII,S$GLB,, | Performed by: PHYSICIAN ASSISTANT

## 2020-10-13 PROCEDURE — 99999 PR PBB SHADOW E&M-EST. PATIENT-LVL IV: CPT | Mod: PBBFAC,,, | Performed by: PHYSICIAN ASSISTANT

## 2020-10-13 PROCEDURE — 99213 OFFICE O/P EST LOW 20 MIN: CPT | Mod: S$GLB,,, | Performed by: PHYSICIAN ASSISTANT

## 2020-10-13 PROCEDURE — 99213 PR OFFICE/OUTPT VISIT, EST, LEVL III, 20-29 MIN: ICD-10-PCS | Mod: S$GLB,,, | Performed by: PHYSICIAN ASSISTANT

## 2020-10-13 PROCEDURE — 99999 PR PBB SHADOW E&M-EST. PATIENT-LVL IV: ICD-10-PCS | Mod: PBBFAC,,, | Performed by: PHYSICIAN ASSISTANT

## 2020-10-13 PROCEDURE — 3008F PR BODY MASS INDEX (BMI) DOCUMENTED: ICD-10-PCS | Mod: CPTII,S$GLB,, | Performed by: PHYSICIAN ASSISTANT

## 2020-10-13 RX ORDER — TRIAMCINOLONE ACETONIDE 1 MG/G
CREAM TOPICAL
COMMUNITY
Start: 2020-08-20 | End: 2021-08-16

## 2020-10-13 RX ORDER — DULOXETIN HYDROCHLORIDE 30 MG/1
30 CAPSULE, DELAYED RELEASE ORAL NIGHTLY
COMMUNITY
Start: 2020-10-05

## 2020-10-13 RX ORDER — HYDRALAZINE HYDROCHLORIDE 10 MG/1
10 TABLET, FILM COATED ORAL 2 TIMES DAILY
COMMUNITY
Start: 2020-08-07

## 2020-10-13 NOTE — PROGRESS NOTES
Subjective:      Patient ID: Eliud Frankel is a 65 y.o. male.    Chief Complaint: Follow-up and Results (CT)    HPI  The patient is here today for CT evaluation of L spine as well as postop evaluation #4.  He reports his back pain is stable.  Denies arm and leg pain.  Overall he is pleased with his results.  Rates his pain 2/10 today.  Patient takes Percocet and Robaxin as needed.  He reports the he was working with his best friend who was a physical therapist but he passed away earlier this year from COVID.      Date of Procedure: 4/6/2020    Procedure: Procedure(s) (LRB):  FUSION, SPINE, POSTERIOR SPINAL COLUMN, LUMBAR, USING COMPUTER-ASSISTED NAVIGATION   (Bilateral)    Pre-Operative Diagnosis: Closed burst fracture of lumbar vertebra, initial encounter [S32.001A]   Post-Operative Diagnosis: Post-Op Diagnosis Codes:     * Closed burst fracture of lumbar vertebra, initial encounter [S32.001A]   Technical Procedures Used:   Placement of pedicle screws bilaterally T11-L3   Posterior arthrodesis T11-L3    partial laminectomy L1   Reduction of burst fracture L 1   Use of stereotactic navigation   Use of allograft bone   Use of autograft bone    Description of the Findings of the Procedure:   Burst fracture L 1       Review of Systems   Constitution: Negative for fever.   HENT: Negative for congestion.    Respiratory: Negative for cough and wheezing.    Skin: Negative for poor wound healing.   Musculoskeletal: Positive for back pain. Negative for falls, joint pain, muscle weakness, myalgias and neck pain.   Gastrointestinal: Negative for abdominal pain, bowel incontinence, diarrhea, nausea and vomiting.   Genitourinary: Negative for bladder incontinence.   Neurological: Negative for numbness and seizures.   Psychiatric/Behavioral: Negative for altered mental status.         Objective:            General    Constitutional: He is oriented to person, place, and time. He appears well-developed and well-nourished.    Neck: Normal range of motion.   Cardiovascular: Normal rate and regular rhythm.    Pulmonary/Chest: Effort normal.   Abdominal: Soft.   Neurological: He is alert and oriented to person, place, and time.   Psychiatric: He has a normal mood and affect. His behavior is normal.                 Neurosurgery exam:      No pain on exam  MAEW  Sensation intact to light touch  Incision has healed very well  No evidence of infection- no erythema, swelling, fluctuance, drainage        Assessment:       Encounter Diagnoses   Name Primary?    S/P lumbar fusion Yes    Postop check           Plan:       Eliud was seen today for follow-up and results.    Diagnoses and all orders for this visit:    S/P lumbar fusion  -     Ambulatory referral/consult to Physical/Occupational Therapy; Future    Postop check  -     Ambulatory referral/consult to Physical/Occupational Therapy; Future          Patient is doing well status post posterior lumbar fusion.  He is ready to start doing more activities.   Okay to slowly resume activities as tolerated.  Will place a referral with outpatient PT facility.  Patient advised to follow-up in April of 2021.   If any issues or changes, will come in sooner.          Rome Mays PA-C      Complications: None.

## 2020-10-14 ENCOUNTER — TELEPHONE (OUTPATIENT)
Dept: NEUROSURGERY | Facility: CLINIC | Age: 65
End: 2020-10-14

## 2020-10-14 NOTE — TELEPHONE ENCOUNTER
Called and spoke with staff from [facility],staff provided me with fax # [phone]. P/t order for pt was faxed over 10/14/20 @ 8:35pm.

## 2020-10-26 ENCOUNTER — TELEPHONE (OUTPATIENT)
Dept: NEUROSURGERY | Facility: CLINIC | Age: 65
End: 2020-10-26

## 2020-10-26 NOTE — PROGRESS NOTES
Subjective:      Patient ID: Eliud Frankel is a 65 y.o. male.    Chief Complaint: Post-op Evaluation    HPI   The patient is here today for postop evaluation #2.  He denies any increased pain.  Doing well overall.      Date of Procedure: 4/6/2020    Procedure: Procedure(s) (LRB):  FUSION, SPINE, POSTERIOR SPINAL COLUMN, LUMBAR, USING COMPUTER-ASSISTED NAVIGATION   (Bilateral)    Pre-Operative Diagnosis: Closed burst fracture of lumbar vertebra, initial encounter [S32.001A]   Post-Operative Diagnosis: Post-Op Diagnosis Codes:     * Closed burst fracture of lumbar vertebra, initial encounter [S32.001A]   Technical Procedures Used:   Placement of pedicle screws bilaterally T11-L3   Posterior arthrodesis T11-L3    partial laminectomy L1   Reduction of burst fracture L 1   Use of stereotactic navigation   Use of allograft bone   Use of autograft bone    Description of the Findings of the Procedure:   Burst fracture L 1     Review of Systems   Constitutional: Negative for fatigue and unexpected weight change.   HENT: Negative for ear pain, hearing loss, tinnitus and voice change.    Respiratory: Negative for shortness of breath.    Cardiovascular: Negative for chest pain.   Gastrointestinal: Negative for abdominal pain.   Musculoskeletal: Positive for back pain. Negative for gait problem, myalgias, neck pain and neck stiffness.   Skin: Negative for color change.   Neurological: Negative for dizziness, tremors, numbness and headaches.   Psychiatric/Behavioral: Negative for agitation and confusion. The patient is not nervous/anxious.          Objective:       Neurosurgery Physical Exam  Ortho/SPM Exam    General     Constitutional: He is oriented to person, place, and time. He appears well-developed and well-nourished.   Neck: Normal range of motion.   Cardiovascular: Normal rate and regular rhythm.    Pulmonary/Chest: Effort normal.   Abdominal: Soft.   Neurological: He is alert and oriented to person, place, and time.    Psychiatric: He has a normal mood and affect. His behavior is normal.                        Neurosurgery exam:      No pain on exam  MAEW  Sensation intact to light touch  Incision healing well  No evidence of infection- no erythema, swelling, fluctuance, drainage         Details    Reading Physician Reading Date Result Priority   Richard Pantoja DO  [phone] 5/19/2020 Routine      Narrative & Impression     EXAMINATION:  XR THORACOLUMBAR SPINE AP LATERAL     CLINICAL HISTORY:  postop;Encounter for follow-up examination after completed treatment for conditions other than malignant neoplasm     TECHNIQUE:  AP and lateral views of the thoracolumbar spine were performed.     COMPARISON:  04/07/2020     FINDINGS:  There are pedicle screws and fixation rods noted on the right at the T11, T12, L2 and L3 levels and at the T11 through L3 levels on the left.  Single interconnecting rosalinda present at the L1 level.  No hardware failure.  There is a fracture seen involving the superior and inferior endplates of L1 with mild vertebral body height loss seen involving the superior endplate of L1.  Vascular calcifications are noted.  Surgical clips noted in the right upper quadrant.  Skin staples have been removed in the interval.     Impression:     As above       I  reviewed all pertinent imaging regarding this case.  Assessment:     1. S/P lumbar fusion      Plan:     S/P lumbar fusion  -     X-Ray Thoracolumbar Spine AP Lateral; Future; Expected date: 05/19/2020      The patient is doing well given recent surgery.  He will continue wearing brace and is aware of his restrictions.  Patient will follow-up in 6 weeks and have repeat x-rays.      Donaldo Orlando MD  Neurosurgery

## 2020-10-26 NOTE — TELEPHONE ENCOUNTER
Faxed over pts last note to his PCP- Dr. Jeannie Riojas at [facility] [phone], per Rome request.

## 2021-04-20 ENCOUNTER — OFFICE VISIT (OUTPATIENT)
Dept: NEUROSURGERY | Facility: CLINIC | Age: 66
End: 2021-04-20
Payer: COMMERCIAL

## 2021-04-20 VITALS
DIASTOLIC BLOOD PRESSURE: 94 MMHG | SYSTOLIC BLOOD PRESSURE: 142 MMHG | BODY MASS INDEX: 38.19 KG/M2 | RESPIRATION RATE: 18 BRPM | WEIGHT: 281.94 LBS | HEART RATE: 66 BPM | HEIGHT: 72 IN

## 2021-04-20 DIAGNOSIS — M54.9 DORSALGIA, UNSPECIFIED: ICD-10-CM

## 2021-04-20 PROCEDURE — 3008F BODY MASS INDEX DOCD: CPT | Mod: CPTII,S$GLB,, | Performed by: NEUROLOGICAL SURGERY

## 2021-04-20 PROCEDURE — 99999 PR PBB SHADOW E&M-EST. PATIENT-LVL III: ICD-10-PCS | Mod: PBBFAC,,, | Performed by: NEUROLOGICAL SURGERY

## 2021-04-20 PROCEDURE — 1126F AMNT PAIN NOTED NONE PRSNT: CPT | Mod: S$GLB,,, | Performed by: NEUROLOGICAL SURGERY

## 2021-04-20 PROCEDURE — 99212 OFFICE O/P EST SF 10 MIN: CPT | Mod: S$GLB,,, | Performed by: NEUROLOGICAL SURGERY

## 2021-04-20 PROCEDURE — 3008F PR BODY MASS INDEX (BMI) DOCUMENTED: ICD-10-PCS | Mod: CPTII,S$GLB,, | Performed by: NEUROLOGICAL SURGERY

## 2021-04-20 PROCEDURE — 99212 PR OFFICE/OUTPT VISIT, EST, LEVL II, 10-19 MIN: ICD-10-PCS | Mod: S$GLB,,, | Performed by: NEUROLOGICAL SURGERY

## 2021-04-20 PROCEDURE — 99999 PR PBB SHADOW E&M-EST. PATIENT-LVL III: CPT | Mod: PBBFAC,,, | Performed by: NEUROLOGICAL SURGERY

## 2021-04-20 PROCEDURE — 1126F PR PAIN SEVERITY QUANTIFIED, NO PAIN PRESENT: ICD-10-PCS | Mod: S$GLB,,, | Performed by: NEUROLOGICAL SURGERY

## 2021-06-22 ENCOUNTER — TELEPHONE (OUTPATIENT)
Dept: NEUROSURGERY | Facility: CLINIC | Age: 66
End: 2021-06-22

## 2021-06-25 ENCOUNTER — TELEPHONE (OUTPATIENT)
Dept: PAIN MEDICINE | Facility: CLINIC | Age: 66
End: 2021-06-25

## 2021-06-25 ENCOUNTER — HOSPITAL ENCOUNTER (OUTPATIENT)
Dept: RADIOLOGY | Facility: HOSPITAL | Age: 66
Discharge: HOME | End: 2021-06-25
Attending: NEUROLOGICAL SURGERY
Payer: MEDICARE

## 2021-06-25 ENCOUNTER — OFFICE VISIT (OUTPATIENT)
Dept: NEUROSURGERY | Facility: CLINIC | Age: 66
End: 2021-06-25
Payer: MEDICARE

## 2021-06-25 VITALS
HEIGHT: 72 IN | DIASTOLIC BLOOD PRESSURE: 90 MMHG | SYSTOLIC BLOOD PRESSURE: 150 MMHG | HEART RATE: 60 BPM | WEIGHT: 281.94 LBS | BODY MASS INDEX: 38.19 KG/M2 | RESPIRATION RATE: 18 BRPM

## 2021-06-25 DIAGNOSIS — M54.9 DORSALGIA, UNSPECIFIED: Primary | ICD-10-CM

## 2021-06-25 DIAGNOSIS — Z98.1 S/P LUMBAR FUSION: ICD-10-CM

## 2021-06-25 DIAGNOSIS — Z98.1 S/P FUSION OF THORACIC SPINE: ICD-10-CM

## 2021-06-25 DIAGNOSIS — M43.16 LUMBAR ADJACENT SEGMENT DISEASE WITH SPONDYLOLISTHESIS: ICD-10-CM

## 2021-06-25 DIAGNOSIS — M43.27 FUSION OF SPINE, LUMBOSACRAL REGION: ICD-10-CM

## 2021-06-25 DIAGNOSIS — M51.36 LUMBAR ADJACENT SEGMENT DISEASE WITH SPONDYLOLISTHESIS: ICD-10-CM

## 2021-06-25 DIAGNOSIS — M54.16 LEFT LUMBAR RADICULOPATHY: Primary | ICD-10-CM

## 2021-06-25 DIAGNOSIS — M54.9 DORSALGIA, UNSPECIFIED: ICD-10-CM

## 2021-06-25 PROCEDURE — 72100 X-RAY EXAM L-S SPINE 2/3 VWS: CPT | Mod: TC

## 2021-06-25 PROCEDURE — 99214 OFFICE O/P EST MOD 30 MIN: CPT | Mod: PBBFAC,25,PO | Performed by: NEUROLOGICAL SURGERY

## 2021-06-25 PROCEDURE — 72082 XR SPINE SURVEY AP AND LATERAL: ICD-10-PCS | Mod: 26,,, | Performed by: RADIOLOGY

## 2021-06-25 PROCEDURE — 72082 X-RAY EXAM ENTIRE SPI 2/3 VW: CPT | Mod: TC

## 2021-06-25 PROCEDURE — 72082 X-RAY EXAM ENTIRE SPI 2/3 VW: CPT | Mod: 26,,, | Performed by: RADIOLOGY

## 2021-06-25 PROCEDURE — 99214 PR OFFICE/OUTPT VISIT, EST, LEVL IV, 30-39 MIN: ICD-10-PCS | Mod: S$PBB,,, | Performed by: NEUROLOGICAL SURGERY

## 2021-06-25 PROCEDURE — 72100 X-RAY EXAM L-S SPINE 2/3 VWS: CPT | Mod: 26,,, | Performed by: RADIOLOGY

## 2021-06-25 PROCEDURE — 72100 XR LUMBAR SPINE AP AND LATERAL: ICD-10-PCS | Mod: 26,,, | Performed by: RADIOLOGY

## 2021-06-25 PROCEDURE — 99214 OFFICE O/P EST MOD 30 MIN: CPT | Mod: S$PBB,,, | Performed by: NEUROLOGICAL SURGERY

## 2021-06-25 PROCEDURE — 99999 PR PBB SHADOW E&M-EST. PATIENT-LVL IV: ICD-10-PCS | Mod: PBBFAC,,, | Performed by: NEUROLOGICAL SURGERY

## 2021-06-25 PROCEDURE — 99999 PR PBB SHADOW E&M-EST. PATIENT-LVL IV: CPT | Mod: PBBFAC,,, | Performed by: NEUROLOGICAL SURGERY

## 2021-06-25 RX ORDER — NEBIVOLOL HYDROCHLORIDE 20 MG/1
1 TABLET ORAL DAILY
COMMUNITY
Start: 2021-04-04

## 2021-06-25 RX ORDER — GABAPENTIN 100 MG/1
100 CAPSULE ORAL 3 TIMES DAILY PRN
Status: ON HOLD | COMMUNITY
Start: 2021-06-07 | End: 2021-08-19 | Stop reason: HOSPADM

## 2021-07-01 ENCOUNTER — PATIENT MESSAGE (OUTPATIENT)
Dept: PAIN MEDICINE | Facility: CLINIC | Age: 66
End: 2021-07-01

## 2021-07-01 ENCOUNTER — TELEPHONE (OUTPATIENT)
Dept: PAIN MEDICINE | Facility: CLINIC | Age: 66
End: 2021-07-01

## 2021-07-08 ENCOUNTER — HOSPITAL ENCOUNTER (OUTPATIENT)
Facility: HOSPITAL | Age: 66
Discharge: HOME | End: 2021-07-08
Attending: ANESTHESIOLOGY | Admitting: ANESTHESIOLOGY
Payer: MEDICARE

## 2021-07-08 DIAGNOSIS — M54.16 LUMBAR RADICULOPATHY: Primary | ICD-10-CM

## 2021-07-08 PROCEDURE — 64483 NJX AA&/STRD TFRM EPI L/S 1: CPT | Mod: LT,,, | Performed by: ANESTHESIOLOGY

## 2021-07-08 PROCEDURE — 63600175 PHARM REV CODE 636 W HCPCS: Performed by: ANESTHESIOLOGY

## 2021-07-08 PROCEDURE — 25000003 PHARM REV CODE 250: Performed by: ANESTHESIOLOGY

## 2021-07-08 PROCEDURE — 64484 PRA INJECT ANES/STEROID FORAMEN LUMBAR/SACRAL W IMG GUIDE ,EA ADD LEVEL: ICD-10-PCS | Mod: LT,,, | Performed by: ANESTHESIOLOGY

## 2021-07-08 PROCEDURE — 64483 PR EPIDURAL INJ, ANES/STEROID, TRANSFORAMINAL, LUMB/SACR, SNGL LEVL: ICD-10-PCS | Mod: LT,,, | Performed by: ANESTHESIOLOGY

## 2021-07-08 PROCEDURE — 25500020 PHARM REV CODE 255: Performed by: ANESTHESIOLOGY

## 2021-07-08 PROCEDURE — 64483 NJX AA&/STRD TFRM EPI L/S 1: CPT | Performed by: ANESTHESIOLOGY

## 2021-07-08 PROCEDURE — 64484 NJX AA&/STRD TFRM EPI L/S EA: CPT | Mod: LT,,, | Performed by: ANESTHESIOLOGY

## 2021-07-08 PROCEDURE — 64484 NJX AA&/STRD TFRM EPI L/S EA: CPT | Performed by: ANESTHESIOLOGY

## 2021-07-08 RX ORDER — INDOMETHACIN 25 MG/1
CAPSULE ORAL
Status: DISCONTINUED | OUTPATIENT
Start: 2021-07-08 | End: 2021-07-08 | Stop reason: HOSPADM

## 2021-07-08 RX ORDER — MIDAZOLAM HYDROCHLORIDE 1 MG/ML
INJECTION, SOLUTION INTRAMUSCULAR; INTRAVENOUS
Status: DISCONTINUED | OUTPATIENT
Start: 2021-07-08 | End: 2021-07-08 | Stop reason: HOSPADM

## 2021-07-08 RX ORDER — LIDOCAINE HYDROCHLORIDE 10 MG/ML
INJECTION, SOLUTION EPIDURAL; INFILTRATION; INTRACAUDAL; PERINEURAL
Status: DISCONTINUED | OUTPATIENT
Start: 2021-07-08 | End: 2021-07-08 | Stop reason: HOSPADM

## 2021-07-08 RX ORDER — FENTANYL CITRATE 50 UG/ML
INJECTION, SOLUTION INTRAMUSCULAR; INTRAVENOUS
Status: DISCONTINUED | OUTPATIENT
Start: 2021-07-08 | End: 2021-07-08 | Stop reason: HOSPADM

## 2021-07-08 RX ORDER — DEXAMETHASONE SODIUM PHOSPHATE 10 MG/ML
INJECTION INTRAMUSCULAR; INTRAVENOUS
Status: DISCONTINUED | OUTPATIENT
Start: 2021-07-08 | End: 2021-07-08 | Stop reason: HOSPADM

## 2021-07-12 VITALS
HEIGHT: 72 IN | OXYGEN SATURATION: 94 % | BODY MASS INDEX: 37.58 KG/M2 | HEART RATE: 61 BPM | TEMPERATURE: 97 F | SYSTOLIC BLOOD PRESSURE: 129 MMHG | WEIGHT: 277.44 LBS | RESPIRATION RATE: 15 BRPM | DIASTOLIC BLOOD PRESSURE: 84 MMHG

## 2021-07-28 ENCOUNTER — PATIENT MESSAGE (OUTPATIENT)
Dept: NEUROSURGERY | Facility: CLINIC | Age: 66
End: 2021-07-28

## 2021-07-29 ENCOUNTER — OFFICE VISIT (OUTPATIENT)
Dept: NEUROSURGERY | Facility: CLINIC | Age: 66
End: 2021-07-29
Payer: MEDICARE

## 2021-07-29 VITALS
RESPIRATION RATE: 16 BRPM | WEIGHT: 277 LBS | BODY MASS INDEX: 37.52 KG/M2 | DIASTOLIC BLOOD PRESSURE: 83 MMHG | HEIGHT: 72 IN | HEART RATE: 65 BPM | SYSTOLIC BLOOD PRESSURE: 128 MMHG

## 2021-07-29 DIAGNOSIS — D69.9 BLEEDING DISORDER: ICD-10-CM

## 2021-07-29 DIAGNOSIS — Z01.818 PRE-OP TESTING: ICD-10-CM

## 2021-07-29 DIAGNOSIS — S32.010S COMPRESSION FRACTURE OF L1 VERTEBRA, SEQUELA: ICD-10-CM

## 2021-07-29 DIAGNOSIS — M54.16 LUMBAR RADICULOPATHY: ICD-10-CM

## 2021-07-29 DIAGNOSIS — M48.062 LUMBAR STENOSIS WITH NEUROGENIC CLAUDICATION: ICD-10-CM

## 2021-07-29 DIAGNOSIS — M43.16 LUMBAR ADJACENT SEGMENT DISEASE WITH SPONDYLOLISTHESIS: ICD-10-CM

## 2021-07-29 DIAGNOSIS — Z98.1 S/P LUMBAR FUSION: Primary | ICD-10-CM

## 2021-07-29 DIAGNOSIS — M51.36 LUMBAR ADJACENT SEGMENT DISEASE WITH SPONDYLOLISTHESIS: ICD-10-CM

## 2021-07-29 PROCEDURE — 99214 OFFICE O/P EST MOD 30 MIN: CPT | Mod: S$PBB,,, | Performed by: NEUROLOGICAL SURGERY

## 2021-07-29 PROCEDURE — 99214 PR OFFICE/OUTPT VISIT, EST, LEVL IV, 30-39 MIN: ICD-10-PCS | Mod: S$PBB,,, | Performed by: NEUROLOGICAL SURGERY

## 2021-07-29 PROCEDURE — 99999 PR PBB SHADOW E&M-EST. PATIENT-LVL IV: ICD-10-PCS | Mod: PBBFAC,,, | Performed by: NEUROLOGICAL SURGERY

## 2021-07-29 PROCEDURE — 99999 PR PBB SHADOW E&M-EST. PATIENT-LVL IV: CPT | Mod: PBBFAC,,, | Performed by: NEUROLOGICAL SURGERY

## 2021-07-29 PROCEDURE — 99214 OFFICE O/P EST MOD 30 MIN: CPT | Mod: PBBFAC,PO | Performed by: NEUROLOGICAL SURGERY

## 2021-07-30 ENCOUNTER — HOSPITAL ENCOUNTER (OUTPATIENT)
Dept: CARDIOLOGY | Facility: HOSPITAL | Age: 66
Discharge: HOME | End: 2021-07-30
Attending: NEUROLOGICAL SURGERY
Payer: MEDICARE

## 2021-07-30 ENCOUNTER — PATIENT MESSAGE (OUTPATIENT)
Dept: NEUROSURGERY | Facility: CLINIC | Age: 66
End: 2021-07-30

## 2021-07-30 ENCOUNTER — HOSPITAL ENCOUNTER (OUTPATIENT)
Dept: RADIOLOGY | Facility: HOSPITAL | Age: 66
Discharge: HOME | End: 2021-07-30
Attending: NEUROLOGICAL SURGERY
Payer: MEDICARE

## 2021-07-30 DIAGNOSIS — Z01.818 PRE-OP TESTING: ICD-10-CM

## 2021-07-30 DIAGNOSIS — Z98.1 S/P LUMBAR FUSION: ICD-10-CM

## 2021-07-30 PROCEDURE — 72082 X-RAY EXAM ENTIRE SPI 2/3 VW: CPT | Mod: TC

## 2021-07-30 PROCEDURE — 71046 XR CHEST PA AND LATERAL PRE-OP: ICD-10-PCS | Mod: 26,,, | Performed by: RADIOLOGY

## 2021-07-30 PROCEDURE — 71046 X-RAY EXAM CHEST 2 VIEWS: CPT | Mod: TC

## 2021-07-30 PROCEDURE — 72082 X-RAY EXAM ENTIRE SPI 2/3 VW: CPT | Mod: 26,,, | Performed by: RADIOLOGY

## 2021-07-30 PROCEDURE — 93005 ELECTROCARDIOGRAM TRACING: CPT

## 2021-07-30 PROCEDURE — 93010 EKG 12-LEAD: ICD-10-PCS | Mod: ,,, | Performed by: INTERNAL MEDICINE

## 2021-07-30 PROCEDURE — 93010 ELECTROCARDIOGRAM REPORT: CPT | Mod: ,,, | Performed by: INTERNAL MEDICINE

## 2021-07-30 PROCEDURE — 72082 XR SPINE SURVEY AP AND LATERAL: ICD-10-PCS | Mod: 26,,, | Performed by: RADIOLOGY

## 2021-07-30 PROCEDURE — 71046 X-RAY EXAM CHEST 2 VIEWS: CPT | Mod: 26,,, | Performed by: RADIOLOGY

## 2021-08-03 ENCOUNTER — TELEPHONE (OUTPATIENT)
Dept: NEUROSURGERY | Facility: CLINIC | Age: 66
End: 2021-08-03

## 2021-08-03 RX ORDER — OXYCODONE AND ACETAMINOPHEN 5; 325 MG/1; MG/1
1 TABLET ORAL EVERY 8 HOURS PRN
Qty: 30 TABLET | Refills: 0 | Status: ON HOLD | OUTPATIENT
Start: 2021-08-03 | End: 2021-08-19 | Stop reason: HOSPADM

## 2021-08-03 RX ORDER — METHOCARBAMOL 750 MG/1
750 TABLET, FILM COATED ORAL 3 TIMES DAILY
Qty: 40 TABLET | Refills: 1 | Status: ON HOLD | OUTPATIENT
Start: 2021-08-03 | End: 2021-08-19 | Stop reason: HOSPADM

## 2021-08-11 ENCOUNTER — TELEPHONE (OUTPATIENT)
Dept: NEUROSURGERY | Facility: CLINIC | Age: 66
End: 2021-08-11

## 2021-08-11 DIAGNOSIS — M54.16 LUMBAR RADICULOPATHY: ICD-10-CM

## 2021-08-11 DIAGNOSIS — M43.16 LUMBAR ADJACENT SEGMENT DISEASE WITH SPONDYLOLISTHESIS: Primary | ICD-10-CM

## 2021-08-11 DIAGNOSIS — Z01.818 PRE-OP TESTING: Primary | ICD-10-CM

## 2021-08-11 DIAGNOSIS — M48.062 LUMBAR STENOSIS WITH NEUROGENIC CLAUDICATION: ICD-10-CM

## 2021-08-11 DIAGNOSIS — M51.36 LUMBAR ADJACENT SEGMENT DISEASE WITH SPONDYLOLISTHESIS: Primary | ICD-10-CM

## 2021-08-12 ENCOUNTER — TELEPHONE (OUTPATIENT)
Dept: NEUROSURGERY | Facility: CLINIC | Age: 66
End: 2021-08-12

## 2021-08-13 ENCOUNTER — TELEPHONE (OUTPATIENT)
Dept: NEUROSURGERY | Facility: CLINIC | Age: 66
End: 2021-08-13

## 2021-08-16 ENCOUNTER — LAB VISIT (OUTPATIENT)
Dept: PRIMARY CARE CLINIC | Facility: CLINIC | Age: 66
End: 2021-08-16
Payer: MEDICARE

## 2021-08-16 DIAGNOSIS — Z01.818 PRE-OP TESTING: ICD-10-CM

## 2021-08-16 LAB — SARS-COV-2 RNA RESP QL NAA+PROBE: NOT DETECTED

## 2021-08-16 PROCEDURE — U0005 INFEC AGEN DETEC AMPLI PROBE: HCPCS | Performed by: NEUROLOGICAL SURGERY

## 2021-08-16 PROCEDURE — U0003 INFECTIOUS AGENT DETECTION BY NUCLEIC ACID (DNA OR RNA); SEVERE ACUTE RESPIRATORY SYNDROME CORONAVIRUS 2 (SARS-COV-2) (CORONAVIRUS DISEASE [COVID-19]), AMPLIFIED PROBE TECHNIQUE, MAKING USE OF HIGH THROUGHPUT TECHNOLOGIES AS DESCRIBED BY CMS-2020-01-R: HCPCS | Performed by: NEUROLOGICAL SURGERY

## 2021-08-17 ENCOUNTER — LAB VISIT (OUTPATIENT)
Dept: LAB | Facility: HOSPITAL | Age: 66
End: 2021-08-17
Attending: NEUROLOGICAL SURGERY
Payer: MEDICARE

## 2021-08-17 DIAGNOSIS — Z01.818 PRE-OP TESTING: ICD-10-CM

## 2021-08-17 LAB
ABO + RH BLD: NORMAL
BLD GP AB SCN CELLS X3 SERPL QL: NORMAL

## 2021-08-17 PROCEDURE — 36415 COLL VENOUS BLD VENIPUNCTURE: CPT | Performed by: NEUROLOGICAL SURGERY

## 2021-08-17 PROCEDURE — 86900 BLOOD TYPING SEROLOGIC ABO: CPT | Performed by: NEUROLOGICAL SURGERY

## 2021-08-18 ENCOUNTER — HOSPITAL ENCOUNTER (OUTPATIENT)
Facility: HOSPITAL | Age: 66
LOS: 1 days | Discharge: HOME | End: 2021-08-19
Attending: NEUROLOGICAL SURGERY | Admitting: NEUROLOGICAL SURGERY
Payer: MEDICARE

## 2021-08-18 ENCOUNTER — ANESTHESIA (OUTPATIENT)
Dept: SURGERY | Facility: HOSPITAL | Age: 66
End: 2021-08-18
Payer: MEDICARE

## 2021-08-18 ENCOUNTER — ANESTHESIA EVENT (OUTPATIENT)
Dept: SURGERY | Facility: HOSPITAL | Age: 66
End: 2021-08-18
Payer: MEDICARE

## 2021-08-18 DIAGNOSIS — M54.16 LUMBAR RADICULOPATHY: ICD-10-CM

## 2021-08-18 DIAGNOSIS — M51.36 LUMBAR ADJACENT SEGMENT DISEASE WITH SPONDYLOLISTHESIS: Primary | Chronic | ICD-10-CM

## 2021-08-18 DIAGNOSIS — M48.062 LUMBAR STENOSIS WITH NEUROGENIC CLAUDICATION: ICD-10-CM

## 2021-08-18 DIAGNOSIS — M43.16 LUMBAR ADJACENT SEGMENT DISEASE WITH SPONDYLOLISTHESIS: Primary | Chronic | ICD-10-CM

## 2021-08-18 PROCEDURE — 20930 PR ALLOGRAFT FOR SPINE SURGERY ONLY MORSELIZED: ICD-10-PCS | Mod: ,,, | Performed by: NEUROLOGICAL SURGERY

## 2021-08-18 PROCEDURE — 22633 PR ARTHRODESIS, COMBINED TECHN, SNGL INTERSPACE, LUMBAR: ICD-10-PCS | Mod: ,,, | Performed by: NEUROLOGICAL SURGERY

## 2021-08-18 PROCEDURE — 88300 SURGICAL PATH GROSS: CPT | Performed by: PATHOLOGY

## 2021-08-18 PROCEDURE — C9290 INJ, BUPIVACAINE LIPOSOME: HCPCS | Performed by: NEUROLOGICAL SURGERY

## 2021-08-18 PROCEDURE — 88300 SURGICAL PATH GROSS: CPT | Mod: 26,,, | Performed by: PATHOLOGY

## 2021-08-18 PROCEDURE — 63600175 PHARM REV CODE 636 W HCPCS: Performed by: NEUROLOGICAL SURGERY

## 2021-08-18 PROCEDURE — 22840 INSERT SPINE FIXATION DEVICE: CPT | Mod: ,,, | Performed by: NEUROLOGICAL SURGERY

## 2021-08-18 PROCEDURE — 71000033 HC RECOVERY, INTIAL HOUR: Performed by: NEUROLOGICAL SURGERY

## 2021-08-18 PROCEDURE — 22633 ARTHRD CMBN 1NTRSPC LUMBAR: CPT | Mod: AS,,, | Performed by: PHYSICIAN ASSISTANT

## 2021-08-18 PROCEDURE — 88300 PR  SURG PATH,GROSS,LEVEL I: ICD-10-PCS | Mod: 26,,, | Performed by: PATHOLOGY

## 2021-08-18 PROCEDURE — 37000008 HC ANESTHESIA 1ST 15 MINUTES: Performed by: NEUROLOGICAL SURGERY

## 2021-08-18 PROCEDURE — 25000003 PHARM REV CODE 250: Performed by: NURSE ANESTHETIST, CERTIFIED REGISTERED

## 2021-08-18 PROCEDURE — 22633 ARTHRD CMBN 1NTRSPC LUMBAR: CPT | Mod: ,,, | Performed by: NEUROLOGICAL SURGERY

## 2021-08-18 PROCEDURE — 22840 INSERT SPINE FIXATION DEVICE: CPT | Mod: AS,,, | Performed by: PHYSICIAN ASSISTANT

## 2021-08-18 PROCEDURE — 22633 PR ARTHRODESIS, COMBINED TECHN, SNGL INTERSPACE, LUMBAR: ICD-10-PCS | Mod: AS,,, | Performed by: PHYSICIAN ASSISTANT

## 2021-08-18 PROCEDURE — 22853 INSJ BIOMECHANICAL DEVICE: CPT | Mod: AS,,, | Performed by: PHYSICIAN ASSISTANT

## 2021-08-18 PROCEDURE — 25000003 PHARM REV CODE 250: Performed by: PHYSICIAN ASSISTANT

## 2021-08-18 PROCEDURE — C1713 ANCHOR/SCREW BN/BN,TIS/BN: HCPCS | Performed by: NEUROLOGICAL SURGERY

## 2021-08-18 PROCEDURE — 20936 PR AUTOGRAFT SPINE SURGERY LOCAL FROM SAME INCISION: ICD-10-PCS | Mod: ,,, | Performed by: NEUROLOGICAL SURGERY

## 2021-08-18 PROCEDURE — 27800903 OPTIME MED/SURG SUP & DEVICES OTHER IMPLANTS: Performed by: NEUROLOGICAL SURGERY

## 2021-08-18 PROCEDURE — 63600175 PHARM REV CODE 636 W HCPCS: Performed by: NURSE ANESTHETIST, CERTIFIED REGISTERED

## 2021-08-18 PROCEDURE — 22853 PR INSERT BIOMECH DEV W/INTERBODY ARTHRODESIS, EA CONTIGUOUS DEFECT: ICD-10-PCS | Mod: ,,, | Performed by: NEUROLOGICAL SURGERY

## 2021-08-18 PROCEDURE — 22840 PR POSTERIOR NON-SEGMENTAL INSTRUMENTATION: ICD-10-PCS | Mod: AS,,, | Performed by: PHYSICIAN ASSISTANT

## 2021-08-18 PROCEDURE — 27201423 OPTIME MED/SURG SUP & DEVICES STERILE SUPPLY: Performed by: NEUROLOGICAL SURGERY

## 2021-08-18 PROCEDURE — 63600175 PHARM REV CODE 636 W HCPCS: Performed by: PHYSICIAN ASSISTANT

## 2021-08-18 PROCEDURE — 71000039 HC RECOVERY, EACH ADD'L HOUR: Performed by: NEUROLOGICAL SURGERY

## 2021-08-18 PROCEDURE — 25000003 PHARM REV CODE 250: Performed by: NEUROLOGICAL SURGERY

## 2021-08-18 PROCEDURE — 22853 INSJ BIOMECHANICAL DEVICE: CPT | Mod: ,,, | Performed by: NEUROLOGICAL SURGERY

## 2021-08-18 PROCEDURE — 25000003 PHARM REV CODE 250: Performed by: ANESTHESIOLOGY

## 2021-08-18 PROCEDURE — 61783 SCAN PROC SPINAL: CPT | Mod: ,,, | Performed by: NEUROLOGICAL SURGERY

## 2021-08-18 PROCEDURE — 36000713 HC OR TIME LEV V EA ADD 15 MIN: Performed by: NEUROLOGICAL SURGERY

## 2021-08-18 PROCEDURE — 20930 SP BONE ALGRFT MORSEL ADD-ON: CPT | Mod: ,,, | Performed by: NEUROLOGICAL SURGERY

## 2021-08-18 PROCEDURE — 22853 PR INSERT BIOMECH DEV W/INTERBODY ARTHRODESIS, EA CONTIGUOUS DEFECT: ICD-10-PCS | Mod: AS,,, | Performed by: PHYSICIAN ASSISTANT

## 2021-08-18 PROCEDURE — 36000712 HC OR TIME LEV V 1ST 15 MIN: Performed by: NEUROLOGICAL SURGERY

## 2021-08-18 PROCEDURE — 22840 PR POSTERIOR NON-SEGMENTAL INSTRUMENTATION: ICD-10-PCS | Mod: ,,, | Performed by: NEUROLOGICAL SURGERY

## 2021-08-18 PROCEDURE — 61783 PR STEREOTACTIC COMP ASSIST PROC,SPINAL: ICD-10-PCS | Mod: ,,, | Performed by: NEUROLOGICAL SURGERY

## 2021-08-18 PROCEDURE — 37000009 HC ANESTHESIA EA ADD 15 MINS: Performed by: NEUROLOGICAL SURGERY

## 2021-08-18 PROCEDURE — 63600175 PHARM REV CODE 636 W HCPCS: Performed by: ANESTHESIOLOGY

## 2021-08-18 PROCEDURE — 20936 SP BONE AGRFT LOCAL ADD-ON: CPT | Mod: ,,, | Performed by: NEUROLOGICAL SURGERY

## 2021-08-18 DEVICE — ROD CD HORIZON SOLERA 5.5-6X60: Type: IMPLANTABLE DEVICE | Site: SPINE LUMBAR | Status: FUNCTIONAL

## 2021-08-18 DEVICE — IMPLANTABLE DEVICE: Type: IMPLANTABLE DEVICE | Site: SPINE LUMBAR | Status: FUNCTIONAL

## 2021-08-18 DEVICE — SCREW SET HORIZON STD 1/4-32: Type: IMPLANTABLE DEVICE | Site: SPINE LUMBAR | Status: FUNCTIONAL

## 2021-08-18 DEVICE — SET SCREW HORIZON SOLERA 5.5-6: Type: IMPLANTABLE DEVICE | Site: SPINE LUMBAR | Status: FUNCTIONAL

## 2021-08-18 RX ORDER — METHOCARBAMOL 750 MG/1
750 TABLET, FILM COATED ORAL EVERY 8 HOURS PRN
Status: DISCONTINUED | OUTPATIENT
Start: 2021-08-18 | End: 2021-08-19 | Stop reason: HOSPADM

## 2021-08-18 RX ORDER — BUPIVACAINE HYDROCHLORIDE 2.5 MG/ML
INJECTION, SOLUTION EPIDURAL; INFILTRATION; INTRACAUDAL
Status: DISCONTINUED | OUTPATIENT
Start: 2021-08-18 | End: 2021-08-18 | Stop reason: HOSPADM

## 2021-08-18 RX ORDER — AMOXICILLIN 250 MG
2 CAPSULE ORAL NIGHTLY PRN
Status: DISCONTINUED | OUTPATIENT
Start: 2021-08-18 | End: 2021-08-19 | Stop reason: HOSPADM

## 2021-08-18 RX ORDER — VANCOMYCIN HYDROCHLORIDE 1 G/20ML
INJECTION, POWDER, LYOPHILIZED, FOR SOLUTION INTRAVENOUS
Status: DISCONTINUED | OUTPATIENT
Start: 2021-08-18 | End: 2021-08-18 | Stop reason: HOSPADM

## 2021-08-18 RX ORDER — OXYCODONE AND ACETAMINOPHEN 5; 325 MG/1; MG/1
1 TABLET ORAL
Status: DISCONTINUED | OUTPATIENT
Start: 2021-08-18 | End: 2021-08-18 | Stop reason: HOSPADM

## 2021-08-18 RX ORDER — PANTOPRAZOLE SODIUM 40 MG/1
40 TABLET, DELAYED RELEASE ORAL DAILY
Status: DISCONTINUED | OUTPATIENT
Start: 2021-08-18 | End: 2021-08-19 | Stop reason: HOSPADM

## 2021-08-18 RX ORDER — ATORVASTATIN CALCIUM 10 MG/1
20 TABLET, FILM COATED ORAL NIGHTLY
Status: DISCONTINUED | OUTPATIENT
Start: 2021-08-18 | End: 2021-08-19 | Stop reason: HOSPADM

## 2021-08-18 RX ORDER — PHENYLEPHRINE HYDROCHLORIDE 10 MG/ML
INJECTION INTRAVENOUS
Status: DISCONTINUED | OUTPATIENT
Start: 2021-08-18 | End: 2021-08-18

## 2021-08-18 RX ORDER — AMLODIPINE BESYLATE 10 MG/1
10 TABLET ORAL NIGHTLY
Status: DISCONTINUED | OUTPATIENT
Start: 2021-08-18 | End: 2021-08-19 | Stop reason: HOSPADM

## 2021-08-18 RX ORDER — OXYCODONE AND ACETAMINOPHEN 7.5; 325 MG/1; MG/1
1 TABLET ORAL EVERY 4 HOURS PRN
Status: DISCONTINUED | OUTPATIENT
Start: 2021-08-18 | End: 2021-08-19 | Stop reason: HOSPADM

## 2021-08-18 RX ORDER — NEBIVOLOL 10 MG/1
20 TABLET ORAL DAILY
Status: DISCONTINUED | OUTPATIENT
Start: 2021-08-18 | End: 2021-08-19 | Stop reason: HOSPADM

## 2021-08-18 RX ORDER — MIDAZOLAM HYDROCHLORIDE 1 MG/ML
INJECTION, SOLUTION INTRAMUSCULAR; INTRAVENOUS
Status: DISCONTINUED | OUTPATIENT
Start: 2021-08-18 | End: 2021-08-18

## 2021-08-18 RX ORDER — DEXAMETHASONE SODIUM PHOSPHATE 4 MG/ML
INJECTION, SOLUTION INTRA-ARTICULAR; INTRALESIONAL; INTRAMUSCULAR; INTRAVENOUS; SOFT TISSUE
Status: DISCONTINUED | OUTPATIENT
Start: 2021-08-18 | End: 2021-08-18

## 2021-08-18 RX ORDER — ROCURONIUM BROMIDE 10 MG/ML
INJECTION, SOLUTION INTRAVENOUS
Status: DISCONTINUED | OUTPATIENT
Start: 2021-08-18 | End: 2021-08-18

## 2021-08-18 RX ORDER — OXYCODONE AND ACETAMINOPHEN 5; 325 MG/1; MG/1
1 TABLET ORAL EVERY 4 HOURS PRN
Status: DISCONTINUED | OUTPATIENT
Start: 2021-08-18 | End: 2021-08-19 | Stop reason: HOSPADM

## 2021-08-18 RX ORDER — PROCHLORPERAZINE EDISYLATE 5 MG/ML
5 INJECTION INTRAMUSCULAR; INTRAVENOUS EVERY 6 HOURS PRN
Status: DISCONTINUED | OUTPATIENT
Start: 2021-08-18 | End: 2021-08-19 | Stop reason: HOSPADM

## 2021-08-18 RX ORDER — OXYCODONE AND ACETAMINOPHEN 10; 325 MG/1; MG/1
1 TABLET ORAL EVERY 4 HOURS PRN
Status: DISCONTINUED | OUTPATIENT
Start: 2021-08-18 | End: 2021-08-19 | Stop reason: HOSPADM

## 2021-08-18 RX ORDER — ONDANSETRON 4 MG/1
8 TABLET, FILM COATED ORAL EVERY 6 HOURS PRN
Status: DISCONTINUED | OUTPATIENT
Start: 2021-08-18 | End: 2021-08-19 | Stop reason: HOSPADM

## 2021-08-18 RX ORDER — SUCCINYLCHOLINE CHLORIDE 20 MG/ML
INJECTION INTRAMUSCULAR; INTRAVENOUS
Status: DISCONTINUED | OUTPATIENT
Start: 2021-08-18 | End: 2021-08-18

## 2021-08-18 RX ORDER — PROPOFOL 10 MG/ML
VIAL (ML) INTRAVENOUS
Status: DISCONTINUED | OUTPATIENT
Start: 2021-08-18 | End: 2021-08-18

## 2021-08-18 RX ORDER — DOCUSATE SODIUM 100 MG/1
100 CAPSULE, LIQUID FILLED ORAL DAILY
Status: DISCONTINUED | OUTPATIENT
Start: 2021-08-19 | End: 2021-08-19 | Stop reason: HOSPADM

## 2021-08-18 RX ORDER — FENTANYL CITRATE 50 UG/ML
INJECTION, SOLUTION INTRAMUSCULAR; INTRAVENOUS
Status: DISCONTINUED | OUTPATIENT
Start: 2021-08-18 | End: 2021-08-18

## 2021-08-18 RX ORDER — PROCHLORPERAZINE EDISYLATE 5 MG/ML
5 INJECTION INTRAMUSCULAR; INTRAVENOUS EVERY 30 MIN PRN
Status: DISCONTINUED | OUTPATIENT
Start: 2021-08-18 | End: 2021-08-18 | Stop reason: HOSPADM

## 2021-08-18 RX ORDER — ONDANSETRON 2 MG/ML
INJECTION INTRAMUSCULAR; INTRAVENOUS
Status: DISCONTINUED | OUTPATIENT
Start: 2021-08-18 | End: 2021-08-18

## 2021-08-18 RX ORDER — MAG HYDROX/ALUMINUM HYD/SIMETH 200-200-20
30 SUSPENSION, ORAL (FINAL DOSE FORM) ORAL EVERY 4 HOURS PRN
Status: DISCONTINUED | OUTPATIENT
Start: 2021-08-18 | End: 2021-08-19 | Stop reason: HOSPADM

## 2021-08-18 RX ORDER — HYDRALAZINE HYDROCHLORIDE 10 MG/1
10 TABLET, FILM COATED ORAL 2 TIMES DAILY
Status: DISCONTINUED | OUTPATIENT
Start: 2021-08-18 | End: 2021-08-19 | Stop reason: HOSPADM

## 2021-08-18 RX ORDER — HYDROMORPHONE HYDROCHLORIDE 2 MG/ML
0.2 INJECTION, SOLUTION INTRAMUSCULAR; INTRAVENOUS; SUBCUTANEOUS EVERY 5 MIN PRN
Status: DISCONTINUED | OUTPATIENT
Start: 2021-08-18 | End: 2021-08-18 | Stop reason: HOSPADM

## 2021-08-18 RX ORDER — LISINOPRIL 20 MG/1
40 TABLET ORAL DAILY
Status: DISCONTINUED | OUTPATIENT
Start: 2021-08-18 | End: 2021-08-19 | Stop reason: HOSPADM

## 2021-08-18 RX ORDER — LIDOCAINE HYDROCHLORIDE 20 MG/ML
INJECTION INTRAVENOUS
Status: DISCONTINUED | OUTPATIENT
Start: 2021-08-18 | End: 2021-08-18

## 2021-08-18 RX ADMIN — FENTANYL CITRATE 50 MCG: 50 INJECTION, SOLUTION INTRAMUSCULAR; INTRAVENOUS at 10:08

## 2021-08-18 RX ADMIN — ROCURONIUM BROMIDE 45 MG: 10 INJECTION, SOLUTION INTRAVENOUS at 07:08

## 2021-08-18 RX ADMIN — PHENYLEPHRINE HYDROCHLORIDE 400 MCG: 10 INJECTION INTRAVENOUS at 07:08

## 2021-08-18 RX ADMIN — HYDROMORPHONE HYDROCHLORIDE 0.2 MG: 2 INJECTION INTRAMUSCULAR; INTRAVENOUS; SUBCUTANEOUS at 01:08

## 2021-08-18 RX ADMIN — PANTOPRAZOLE SODIUM 40 MG: 40 TABLET, DELAYED RELEASE ORAL at 02:08

## 2021-08-18 RX ADMIN — HYDROMORPHONE HYDROCHLORIDE 0.2 MG: 2 INJECTION INTRAMUSCULAR; INTRAVENOUS; SUBCUTANEOUS at 12:08

## 2021-08-18 RX ADMIN — FENTANYL CITRATE 50 MCG: 50 INJECTION, SOLUTION INTRAMUSCULAR; INTRAVENOUS at 11:08

## 2021-08-18 RX ADMIN — SODIUM CHLORIDE, SODIUM LACTATE, POTASSIUM CHLORIDE, AND CALCIUM CHLORIDE: .6; .31; .03; .02 INJECTION, SOLUTION INTRAVENOUS at 07:08

## 2021-08-18 RX ADMIN — OXYCODONE HYDROCHLORIDE AND ACETAMINOPHEN 1 TABLET: 5; 325 TABLET ORAL at 05:08

## 2021-08-18 RX ADMIN — PHENYLEPHRINE HYDROCHLORIDE 100 MCG: 10 INJECTION INTRAVENOUS at 07:08

## 2021-08-18 RX ADMIN — AMLODIPINE BESYLATE 10 MG: 10 TABLET ORAL at 09:08

## 2021-08-18 RX ADMIN — ROCURONIUM BROMIDE 5 MG: 10 INJECTION, SOLUTION INTRAVENOUS at 07:08

## 2021-08-18 RX ADMIN — OXYCODONE HYDROCHLORIDE AND ACETAMINOPHEN 1 TABLET: 5; 325 TABLET ORAL at 02:08

## 2021-08-18 RX ADMIN — DEXAMETHASONE SODIUM PHOSPHATE 8 MG: 4 INJECTION, SOLUTION INTRAMUSCULAR; INTRAVENOUS at 11:08

## 2021-08-18 RX ADMIN — PROPOFOL 150 MG: 10 INJECTION, EMULSION INTRAVENOUS at 07:08

## 2021-08-18 RX ADMIN — FENTANYL CITRATE 50 MCG: 50 INJECTION, SOLUTION INTRAMUSCULAR; INTRAVENOUS at 12:08

## 2021-08-18 RX ADMIN — ATORVASTATIN CALCIUM 20 MG: 10 TABLET, FILM COATED ORAL at 09:08

## 2021-08-18 RX ADMIN — HYDROMORPHONE HYDROCHLORIDE 0.2 MG: 2 INJECTION INTRAMUSCULAR; INTRAVENOUS; SUBCUTANEOUS at 02:08

## 2021-08-18 RX ADMIN — VANCOMYCIN HYDROCHLORIDE 1500 MG: 1.5 INJECTION, POWDER, LYOPHILIZED, FOR SOLUTION INTRAVENOUS at 07:08

## 2021-08-18 RX ADMIN — SUCCINYLCHOLINE CHLORIDE 140 MG: 20 INJECTION, SOLUTION INTRAMUSCULAR; INTRAVENOUS at 07:08

## 2021-08-18 RX ADMIN — CALCIUM CHLORIDE 500 MG: 100 INJECTION, SOLUTION INTRAVENOUS at 07:08

## 2021-08-18 RX ADMIN — FENTANYL CITRATE 100 MCG: 50 INJECTION, SOLUTION INTRAMUSCULAR; INTRAVENOUS at 07:08

## 2021-08-18 RX ADMIN — PHENYLEPHRINE HYDROCHLORIDE 200 MCG: 10 INJECTION INTRAVENOUS at 07:08

## 2021-08-18 RX ADMIN — CALCIUM CHLORIDE 500 MG: 100 INJECTION, SOLUTION INTRAVENOUS at 08:08

## 2021-08-18 RX ADMIN — LIDOCAINE HYDROCHLORIDE 100 MG: 20 INJECTION, SOLUTION INTRAVENOUS at 07:08

## 2021-08-18 RX ADMIN — LISINOPRIL 40 MG: 20 TABLET ORAL at 02:08

## 2021-08-18 RX ADMIN — OXYCODONE AND ACETAMINOPHEN 1 TABLET: 10; 325 TABLET ORAL at 09:08

## 2021-08-18 RX ADMIN — PHENYLEPHRINE HYDROCHLORIDE 300 MCG: 10 INJECTION INTRAVENOUS at 08:08

## 2021-08-18 RX ADMIN — HYDRALAZINE HYDROCHLORIDE 10 MG: 10 TABLET, FILM COATED ORAL at 09:08

## 2021-08-18 RX ADMIN — MIDAZOLAM HYDROCHLORIDE 2 MG: 1 INJECTION, SOLUTION INTRAMUSCULAR; INTRAVENOUS at 07:08

## 2021-08-19 VITALS
BODY MASS INDEX: 37.59 KG/M2 | HEART RATE: 74 BPM | HEIGHT: 72 IN | OXYGEN SATURATION: 99 % | DIASTOLIC BLOOD PRESSURE: 68 MMHG | TEMPERATURE: 98 F | SYSTOLIC BLOOD PRESSURE: 115 MMHG | WEIGHT: 277.56 LBS | RESPIRATION RATE: 16 BRPM

## 2021-08-19 PROCEDURE — 25000003 PHARM REV CODE 250: Performed by: PHYSICIAN ASSISTANT

## 2021-08-19 PROCEDURE — 94760 N-INVAS EAR/PLS OXIMETRY 1: CPT

## 2021-08-19 PROCEDURE — 94799 UNLISTED PULMONARY SVC/PX: CPT

## 2021-08-19 RX ORDER — AMOXICILLIN 250 MG
2 CAPSULE ORAL NIGHTLY PRN
Qty: 14 TABLET | Refills: 0 | Status: SHIPPED | OUTPATIENT
Start: 2021-08-19 | End: 2023-10-06

## 2021-08-19 RX ORDER — OXYCODONE AND ACETAMINOPHEN 10; 325 MG/1; MG/1
1 TABLET ORAL EVERY 4 HOURS PRN
Qty: 30 TABLET | Refills: 0 | Status: SHIPPED | OUTPATIENT
Start: 2021-08-19 | End: 2021-09-02 | Stop reason: SDUPTHER

## 2021-08-19 RX ORDER — METHOCARBAMOL 750 MG/1
750 TABLET, FILM COATED ORAL 3 TIMES DAILY PRN
Qty: 60 TABLET | Refills: 0 | Status: SHIPPED | OUTPATIENT
Start: 2021-08-19 | End: 2022-03-21 | Stop reason: SDUPTHER

## 2021-08-19 RX ORDER — DOCUSATE SODIUM 100 MG/1
100 CAPSULE, LIQUID FILLED ORAL DAILY
Qty: 14 CAPSULE | Refills: 0 | Status: SHIPPED | OUTPATIENT
Start: 2021-08-20 | End: 2023-10-06

## 2021-08-19 RX ADMIN — PANTOPRAZOLE SODIUM 40 MG: 40 TABLET, DELAYED RELEASE ORAL at 09:08

## 2021-08-19 RX ADMIN — HYDRALAZINE HYDROCHLORIDE 10 MG: 10 TABLET, FILM COATED ORAL at 09:08

## 2021-08-19 RX ADMIN — OXYCODONE AND ACETAMINOPHEN 1 TABLET: 10; 325 TABLET ORAL at 02:08

## 2021-08-19 RX ADMIN — LISINOPRIL 40 MG: 20 TABLET ORAL at 09:08

## 2021-08-19 RX ADMIN — NEBIVOLOL HYDROCHLORIDE 20 MG: 10 TABLET ORAL at 09:08

## 2021-08-19 RX ADMIN — OXYCODONE AND ACETAMINOPHEN 1 TABLET: 10; 325 TABLET ORAL at 07:08

## 2021-08-19 RX ADMIN — DOCUSATE SODIUM 100 MG: 100 CAPSULE, LIQUID FILLED ORAL at 09:08

## 2021-08-23 LAB
FINAL PATHOLOGIC DIAGNOSIS: NORMAL
GROSS: NORMAL
Lab: NORMAL

## 2021-08-31 ENCOUNTER — TELEPHONE (OUTPATIENT)
Dept: NEUROSURGERY | Facility: CLINIC | Age: 66
End: 2021-08-31

## 2021-09-01 ENCOUNTER — PATIENT MESSAGE (OUTPATIENT)
Dept: NEUROSURGERY | Facility: CLINIC | Age: 66
End: 2021-09-01

## 2021-09-01 ENCOUNTER — TELEPHONE (OUTPATIENT)
Dept: NEUROSURGERY | Facility: CLINIC | Age: 66
End: 2021-09-01

## 2021-09-02 ENCOUNTER — TELEPHONE (OUTPATIENT)
Dept: NEUROSURGERY | Facility: CLINIC | Age: 66
End: 2021-09-02

## 2021-09-02 ENCOUNTER — HOSPITAL ENCOUNTER (OUTPATIENT)
Dept: RADIOLOGY | Facility: HOSPITAL | Age: 66
Discharge: HOME | End: 2021-09-02
Attending: PHYSICIAN ASSISTANT
Payer: MEDICARE

## 2021-09-02 ENCOUNTER — OFFICE VISIT (OUTPATIENT)
Dept: NEUROSURGERY | Facility: CLINIC | Age: 66
End: 2021-09-02
Payer: MEDICARE

## 2021-09-02 VITALS
RESPIRATION RATE: 16 BRPM | WEIGHT: 277 LBS | DIASTOLIC BLOOD PRESSURE: 89 MMHG | HEIGHT: 72 IN | BODY MASS INDEX: 37.52 KG/M2 | HEART RATE: 68 BPM | SYSTOLIC BLOOD PRESSURE: 137 MMHG

## 2021-09-02 DIAGNOSIS — Z48.89 ENCOUNTER FOR POSTOPERATIVE WOUND CHECK: ICD-10-CM

## 2021-09-02 DIAGNOSIS — Z98.1 S/P LUMBAR FUSION: Primary | ICD-10-CM

## 2021-09-02 DIAGNOSIS — M54.9 DORSALGIA, UNSPECIFIED: ICD-10-CM

## 2021-09-02 DIAGNOSIS — Z48.02 ENCOUNTER FOR STAPLE REMOVAL: ICD-10-CM

## 2021-09-02 PROCEDURE — 99024 PR POST-OP FOLLOW-UP VISIT: ICD-10-PCS | Mod: POP,,, | Performed by: PHYSICIAN ASSISTANT

## 2021-09-02 PROCEDURE — 99024 POSTOP FOLLOW-UP VISIT: CPT | Mod: POP,,, | Performed by: PHYSICIAN ASSISTANT

## 2021-09-02 PROCEDURE — 99215 OFFICE O/P EST HI 40 MIN: CPT | Mod: PBBFAC | Performed by: PHYSICIAN ASSISTANT

## 2021-09-02 PROCEDURE — 72100 X-RAY EXAM L-S SPINE 2/3 VWS: CPT | Mod: TC

## 2021-09-02 PROCEDURE — 72100 X-RAY EXAM L-S SPINE 2/3 VWS: CPT | Mod: 26,,, | Performed by: RADIOLOGY

## 2021-09-02 PROCEDURE — 72100 XR LUMBAR SPINE AP AND LATERAL: ICD-10-PCS | Mod: 26,,, | Performed by: RADIOLOGY

## 2021-09-02 PROCEDURE — 99999 PR PBB SHADOW E&M-EST. PATIENT-LVL V: ICD-10-PCS | Mod: PBBFAC,,, | Performed by: PHYSICIAN ASSISTANT

## 2021-09-02 PROCEDURE — 99999 PR PBB SHADOW E&M-EST. PATIENT-LVL V: CPT | Mod: PBBFAC,,, | Performed by: PHYSICIAN ASSISTANT

## 2021-09-02 RX ORDER — OXYCODONE AND ACETAMINOPHEN 10; 325 MG/1; MG/1
1 TABLET ORAL EVERY 4 HOURS PRN
Qty: 30 TABLET | Refills: 0 | Status: SHIPPED | OUTPATIENT
Start: 2021-09-02 | End: 2021-10-01

## 2021-10-01 ENCOUNTER — HOSPITAL ENCOUNTER (OUTPATIENT)
Dept: RADIOLOGY | Facility: HOSPITAL | Age: 66
Discharge: HOME | End: 2021-10-01
Attending: PHYSICIAN ASSISTANT
Payer: MEDICARE

## 2021-10-01 ENCOUNTER — TELEPHONE (OUTPATIENT)
Dept: NEUROSURGERY | Facility: CLINIC | Age: 66
End: 2021-10-01

## 2021-10-01 ENCOUNTER — OFFICE VISIT (OUTPATIENT)
Dept: NEUROSURGERY | Facility: CLINIC | Age: 66
End: 2021-10-01
Payer: MEDICARE

## 2021-10-01 VITALS
DIASTOLIC BLOOD PRESSURE: 100 MMHG | SYSTOLIC BLOOD PRESSURE: 154 MMHG | WEIGHT: 277 LBS | HEART RATE: 75 BPM | RESPIRATION RATE: 16 BRPM | BODY MASS INDEX: 37.52 KG/M2 | HEIGHT: 72 IN

## 2021-10-01 DIAGNOSIS — Z98.1 S/P LUMBAR FUSION: Primary | ICD-10-CM

## 2021-10-01 DIAGNOSIS — S32.001D CLOSED BURST FRACTURE OF LUMBAR VERTEBRA WITH ROUTINE HEALING, SUBSEQUENT ENCOUNTER: ICD-10-CM

## 2021-10-01 DIAGNOSIS — M51.36 LUMBAR ADJACENT SEGMENT DISEASE WITH SPONDYLOLISTHESIS: ICD-10-CM

## 2021-10-01 DIAGNOSIS — M54.9 DORSALGIA, UNSPECIFIED: ICD-10-CM

## 2021-10-01 DIAGNOSIS — M43.16 LUMBAR ADJACENT SEGMENT DISEASE WITH SPONDYLOLISTHESIS: ICD-10-CM

## 2021-10-01 PROCEDURE — 72100 X-RAY EXAM L-S SPINE 2/3 VWS: CPT | Mod: 26,,, | Performed by: RADIOLOGY

## 2021-10-01 PROCEDURE — 99024 PR POST-OP FOLLOW-UP VISIT: ICD-10-PCS | Mod: POP,,, | Performed by: NEUROLOGICAL SURGERY

## 2021-10-01 PROCEDURE — 99024 POSTOP FOLLOW-UP VISIT: CPT | Mod: POP,,, | Performed by: NEUROLOGICAL SURGERY

## 2021-10-01 PROCEDURE — 99213 OFFICE O/P EST LOW 20 MIN: CPT | Mod: PBBFAC | Performed by: NEUROLOGICAL SURGERY

## 2021-10-01 PROCEDURE — 99999 PR PBB SHADOW E&M-EST. PATIENT-LVL III: CPT | Mod: PBBFAC,,, | Performed by: NEUROLOGICAL SURGERY

## 2021-10-01 PROCEDURE — 99999 PR PBB SHADOW E&M-EST. PATIENT-LVL III: ICD-10-PCS | Mod: PBBFAC,,, | Performed by: NEUROLOGICAL SURGERY

## 2021-10-01 PROCEDURE — 72100 X-RAY EXAM L-S SPINE 2/3 VWS: CPT | Mod: TC

## 2021-10-01 PROCEDURE — 72100 XR LUMBAR SPINE AP AND LATERAL: ICD-10-PCS | Mod: 26,,, | Performed by: RADIOLOGY

## 2021-10-01 RX ORDER — OXYCODONE AND ACETAMINOPHEN 10; 325 MG/1; MG/1
1 TABLET ORAL EVERY 4 HOURS PRN
Qty: 30 TABLET | Refills: 0 | Status: SHIPPED | OUTPATIENT
Start: 2021-10-02 | End: 2023-09-08

## 2021-12-30 ENCOUNTER — TELEPHONE (OUTPATIENT)
Dept: NEUROSURGERY | Facility: CLINIC | Age: 66
End: 2021-12-30
Payer: MEDICARE

## 2022-01-25 ENCOUNTER — TELEPHONE (OUTPATIENT)
Dept: NEUROSURGERY | Facility: CLINIC | Age: 67
End: 2022-01-25
Payer: MEDICARE

## 2022-01-25 NOTE — TELEPHONE ENCOUNTER
I spoke with the pt in regards to his missed appts. The pt stated he had a fall and went in to see his PCP who scheduled some XR and determined nothing was broken or out of place. The pt stated overall he is doing fine and would like Dr. orlando to take a look at his Xr to make sure there's nothing wrong.. I informed the pt that he can drop those disk off to us and I will have the provider take a look at them.. I informed the pt that is there no cause for concerns he can f/u with us as needed being that he feels he does not need to f/u with us...    Understanding was verbalized         ----- Message from Joey Chamorro sent at 1/25/2022  2:49 PM CST -----  Name Of Caller: Eliud        Provider Name: Donaldo Orlando        Does patient feel the need to be seen today? no        Relationship to the Pt?: patient        Contact Preference?: [phone]        What is the nature of the call?: patient states that he would like to speak with someone in the office regarding a post-op appointment that he missed on Friday 1-

## 2022-03-17 ENCOUNTER — TELEPHONE (OUTPATIENT)
Dept: NEUROSURGERY | Facility: CLINIC | Age: 67
End: 2022-03-17
Payer: MEDICARE

## 2022-03-17 NOTE — TELEPHONE ENCOUNTER
----- Message from Thalia Gilbert sent at 3/17/2022  8:51 AM CDT -----  Contact: [facility]  .Type:  Needs Medical Advice    Who Called: emani   Symptoms (please be specific):   How long has patient had these symptoms:   Pharmacy name and phone #:    Would the patient rather a call back or a response via [facility]?   Call    Best Call Back Number: [phone] or fax  [phone]  Additional Information: Caller is requesting  a call back from  the nurse in regards to them needing  the pt plan of care for  02/08/22 and 03/10/22 signed and faxed back  to  them  please

## 2022-03-21 ENCOUNTER — TELEPHONE (OUTPATIENT)
Dept: NEUROSURGERY | Facility: CLINIC | Age: 67
End: 2022-03-21
Payer: MEDICARE

## 2022-03-21 RX ORDER — METHOCARBAMOL 750 MG/1
750 TABLET, FILM COATED ORAL 3 TIMES DAILY PRN
Qty: 60 TABLET | Refills: 1 | Status: SHIPPED | OUTPATIENT
Start: 2022-03-21 | End: 2023-10-06

## 2022-03-21 NOTE — TELEPHONE ENCOUNTER
----- Message from Rukhsana Conrad MA sent at 3/21/2022  3:17 PM CDT -----  Contact: Pt Home [phone]  Cheryl see message below.   ----- Message -----  From: Janay Moise  Sent: 3/21/2022   1:47 PM CDT  To: Johanny Fulton Staff    Patient is calling in regards to him saying that he was told the his script number for his medication   methocarbamoL (ROBAXIN) 750 MG Tab is not coming up at the [facility] [address] [place], [place] - [address] [facility] [address] [facility] [address] and he would like to speak with you about this please.     Patient said that he stopped taking this script but he would like to have them filled again please.

## 2023-06-19 ENCOUNTER — TELEPHONE (OUTPATIENT)
Dept: NEUROSURGERY | Facility: CLINIC | Age: 68
End: 2023-06-19
Payer: MEDICARE

## 2023-06-19 DIAGNOSIS — Z98.1 S/P LUMBAR FUSION: Primary | ICD-10-CM

## 2023-06-23 ENCOUNTER — TELEPHONE (OUTPATIENT)
Dept: NEUROSURGERY | Facility: CLINIC | Age: 68
End: 2023-06-23
Payer: MEDICARE

## 2023-08-04 ENCOUNTER — OFFICE VISIT (OUTPATIENT)
Dept: NEUROSURGERY | Facility: CLINIC | Age: 68
End: 2023-08-04
Payer: MEDICARE

## 2023-08-04 ENCOUNTER — HOSPITAL ENCOUNTER (OUTPATIENT)
Dept: RADIOLOGY | Facility: HOSPITAL | Age: 68
Discharge: HOME | End: 2023-08-04
Attending: NEUROLOGICAL SURGERY
Payer: MEDICARE

## 2023-08-04 VITALS
DIASTOLIC BLOOD PRESSURE: 88 MMHG | HEART RATE: 61 BPM | BODY MASS INDEX: 40.04 KG/M2 | SYSTOLIC BLOOD PRESSURE: 136 MMHG | RESPIRATION RATE: 16 BRPM | HEIGHT: 72 IN | WEIGHT: 295.63 LBS

## 2023-08-04 DIAGNOSIS — M48.02 SPINAL STENOSIS, CERVICAL REGION: Primary | ICD-10-CM

## 2023-08-04 DIAGNOSIS — M51.36 DEGENERATIVE DISC DISEASE, LUMBAR: ICD-10-CM

## 2023-08-04 DIAGNOSIS — Z98.1 S/P LUMBAR FUSION: ICD-10-CM

## 2023-08-04 DIAGNOSIS — S32.001D CLOSED BURST FRACTURE OF LUMBAR VERTEBRA WITH ROUTINE HEALING, SUBSEQUENT ENCOUNTER: ICD-10-CM

## 2023-08-04 DIAGNOSIS — M50.30 DEGENERATIVE DISC DISEASE, CERVICAL: ICD-10-CM

## 2023-08-04 PROCEDURE — 99999 PR PBB SHADOW E&M-EST. PATIENT-LVL III: CPT | Mod: PBBFAC,,, | Performed by: NEUROLOGICAL SURGERY

## 2023-08-04 PROCEDURE — 72080 X-RAY EXAM THORACOLMB 2/> VW: CPT | Mod: TC

## 2023-08-04 PROCEDURE — 72080 XR THORACOLUMBAR SPINE AP LATERAL: ICD-10-PCS | Mod: 26,,, | Performed by: RADIOLOGY

## 2023-08-04 PROCEDURE — 99213 OFFICE O/P EST LOW 20 MIN: CPT | Mod: PBBFAC,PO | Performed by: NEUROLOGICAL SURGERY

## 2023-08-04 PROCEDURE — 72080 X-RAY EXAM THORACOLMB 2/> VW: CPT | Mod: 26,,, | Performed by: RADIOLOGY

## 2023-08-04 PROCEDURE — 99214 PR OFFICE/OUTPT VISIT, EST, LEVL IV, 30-39 MIN: ICD-10-PCS | Mod: S$PBB,,, | Performed by: NEUROLOGICAL SURGERY

## 2023-08-04 PROCEDURE — 99999 PR PBB SHADOW E&M-EST. PATIENT-LVL III: ICD-10-PCS | Mod: PBBFAC,,, | Performed by: NEUROLOGICAL SURGERY

## 2023-08-04 PROCEDURE — 99214 OFFICE O/P EST MOD 30 MIN: CPT | Mod: S$PBB,,, | Performed by: NEUROLOGICAL SURGERY

## 2023-08-04 NOTE — PROGRESS NOTES
"Subjective:      Patient ID: Eliud Frankel is a 68 y.o. male.    Chief Complaint: Arm Pain (Pt describes pain as a "tingling" feeling on right arm. Pain radiates from shoulder to elbow. Pain worsens if back is straighten. Pain alleviates if arm is raised over head and if head is tilt forward. )    Patient is here today for follow-up  Previous history of having an T11-L3 fusion for burst fracture  Subsequent extension of fusion to L3-4 for herniated disc with radiculopathy approximately 2 years ago  He is done well from this and has been back to his normal activity    He states that over the past 6 months he has been having progressively worse neck pain going into the shoulder behind his arm into his scapular area.  Radiating into his hand.  He has numbness and tingling into the thumb only.  Does not report any numbness and tingling into the 2nd and 3rd digit.  He notices symptoms most at night when he is lying down in certain positions.  States that when he lifts his hands over his head the symptoms get better  Does not take any medications for this  He does not have any symptoms down the left  Pain and scapular symptoms ranging anywhere between 2 and 5 out of severity  Denies any weakness  Denies any bowel bladder symptoms  No previous surgery her imaging of the cervical spine review today    He did an x-ray of the thoracolumbar spine to review his screws from the prior thoracolumbar fusion  He will intermittently have soreness to the lower back  He had some pain into the right foot which was diagnosis plantar fasciitis  This got worse after he went to the gym and was riding on a stationary bicycle  It has gotten better with rest and it is not bothering him today in the clinic  No other new symptoms to report today    X-ray of the thoracolumbar spine completed radiology report is as below  Pedicle screws and fixation rods are seen bilaterally at the T11 through the L4 levels.  Interconnecting rosalinda also in place. "  Intradiscal spacer present at the L3-4 level.  Pedicle screws and fixation rods at the L3-4 level are new when compared to the prior study.  Surgical clips noted within the right upper quadrant of the abdomen.   No hardware complication identified    Of note he is currently being worked up for an adrenal mass by the Endocrinology service at [facility].  He is had multiple CT scans and lab during the process of working up this lesion      Review of Systems   Constitutional:  Negative for activity change, appetite change and chills.   HENT:  Negative for congestion and ear pain.    Eyes:  Negative for photophobia, redness and visual disturbance.   Respiratory:  Negative for apnea and chest tightness.    Cardiovascular:  Negative for chest pain.   Gastrointestinal:  Negative for abdominal distention and abdominal pain.   Endocrine: Negative for cold intolerance.   Genitourinary:  Negative for difficulty urinating.   Musculoskeletal:  Positive for myalgias, neck pain and neck stiffness. Negative for arthralgias.   Skin:  Negative for color change.   Allergic/Immunologic: Negative for environmental allergies.   Neurological:  Positive for numbness. Negative for dizziness and weakness.   Hematological:  Negative for adenopathy. Does not bruise/bleed easily.   Psychiatric/Behavioral:  Negative for agitation, behavioral problems and confusion.          Objective:       Physical Exam:  Nursing note and vitals reviewed.    Constitutional: He appears well-nourished. No distress.     Eyes: EOM are normal.     Cardiovascular: Normal rate.     Psych/Behavior: He is alert. He is oriented to person, place, and time. He has a normal mood and affect.     Musculoskeletal:        Neck: Range of motion is limited. There is tenderness. Muscle strength is 5/5.        Right Upper Extremities: Range of motion is full. There is tenderness. Tenderness is located biceps and forarm tenderness to plapation good ROM . no weakness. Muscle  strength is 5/5.        Left Upper Extremities: There is no tenderness. Muscle strength is 5/5.     Neurological:        Sensory: There is no sensory deficit in the trunk. There is no sensory deficit in the extremities.        Cranial nerves: Cranial nerve(s) II, III, IV, V, VI, VII, VIII, IX, X, XI and XII are intact.     General    Nursing note and vitals reviewed.  Constitutional: He is oriented to person, place, and time. He appears well-nourished. No distress.   Eyes: EOM are normal.   Cardiovascular:  Normal rate.            Neurological: He is alert and oriented to person, place, and time.   Psychiatric: He has a normal mood and affect.           X-ray thoraco lumbar spine    Assessment:     1. Spinal stenosis, cervical region    2. Degenerative disc disease, cervical    3. Degenerative disc disease, lumbar    4. S/P lumbar fusion    5. Closed burst fracture of lumbar vertebra with routine healing, subsequent encounter      Plan:     Spinal stenosis, cervical region  -     MRI Cervical Spine Without Contrast; Future; Expected date: 08/04/2023    Degenerative disc disease, cervical    Degenerative disc disease, lumbar    S/P lumbar fusion    Closed burst fracture of lumbar vertebra with routine healing, subsequent encounter    Patient is doing well status post long segment fusion for burst fracture thoracolumbar spine.  Instrumentation in place without any hardware complication identified  Patient having right foot symptoms consistent with plantar fasciitis being treated by the medical services    He does have pain into the neck going into the C5-6 distribution which would be consistent with the symptoms.  He is tried light exercise at his home gym which has exacerbated the symptoms.  The symptoms are worsening in severity and now waking him up from sleep at night.  I would like to start with an MRI of the cervical spine to look for any pathology on the right-sided C5-6 which would correlate with his clinical  symptoms.    I would like to schedule an audio follow-up visit with him after the MRI is complete to discuss findings and discuss future treatment options    Thank you for the referral   Please call with any questions  Disclaimer: This note was prepared using a voice recognition system and is likely to have sound alike errors within the text.    \    Donaldo Orlando MD  Neurosurgery     Disclaimer: This note was prepared using a voice recognition system and is likely to have sound alike errors within the text.

## 2023-08-24 ENCOUNTER — HOSPITAL ENCOUNTER (OUTPATIENT)
Dept: RADIOLOGY | Facility: HOSPITAL | Age: 68
Discharge: HOME | End: 2023-08-24
Attending: NEUROLOGICAL SURGERY
Payer: MEDICARE

## 2023-08-24 DIAGNOSIS — M48.02 SPINAL STENOSIS, CERVICAL REGION: ICD-10-CM

## 2023-08-24 PROCEDURE — 72141 MRI NECK SPINE W/O DYE: CPT | Mod: TC

## 2023-08-24 PROCEDURE — 72141 MRI CERVICAL SPINE WITHOUT CONTRAST: ICD-10-PCS | Mod: 26,,, | Performed by: RADIOLOGY

## 2023-08-24 PROCEDURE — 72141 MRI NECK SPINE W/O DYE: CPT | Mod: 26,,, | Performed by: RADIOLOGY

## 2023-09-01 ENCOUNTER — TELEPHONE (OUTPATIENT)
Dept: NEUROSURGERY | Facility: CLINIC | Age: 68
End: 2023-09-01
Payer: MEDICARE

## 2023-09-01 NOTE — TELEPHONE ENCOUNTER
----- Message from Adiel Melendez sent at 9/1/2023  2:02 PM CDT -----  Contact: [phone]  pt states he have been waiting on appt call since 1:00 ,states no one has called yet . Please call back at [phone] . Thanksdj

## 2023-09-08 ENCOUNTER — OFFICE VISIT (OUTPATIENT)
Dept: NEUROSURGERY | Facility: CLINIC | Age: 68
End: 2023-09-08
Payer: MEDICARE

## 2023-09-08 DIAGNOSIS — M51.36 DEGENERATIVE DISC DISEASE, LUMBAR: ICD-10-CM

## 2023-09-08 DIAGNOSIS — M50.30 DEGENERATIVE DISC DISEASE, CERVICAL: Primary | ICD-10-CM

## 2023-09-08 PROCEDURE — 99443 PR PHYSICIAN TELEPHONE EVALUATION 21-30 MIN: ICD-10-PCS | Mod: 95,,, | Performed by: NEUROLOGICAL SURGERY

## 2023-09-08 PROCEDURE — 99443 PR PHYSICIAN TELEPHONE EVALUATION 21-30 MIN: CPT | Mod: 95,,, | Performed by: NEUROLOGICAL SURGERY

## 2023-09-08 RX ORDER — TRAMADOL HYDROCHLORIDE 50 MG/1
50 TABLET ORAL EVERY 6 HOURS PRN
Qty: 60 TABLET | Refills: 0 | Status: CANCELLED | OUTPATIENT
Start: 2023-09-08

## 2023-09-08 RX ORDER — MELOXICAM 7.5 MG/1
7.5 TABLET ORAL DAILY
Qty: 30 TABLET | Refills: 0 | Status: SHIPPED | OUTPATIENT
Start: 2023-09-08 | End: 2023-10-08

## 2023-09-08 NOTE — PROGRESS NOTES
Audio Only Telehealth Visit     The patient location is: home   The chief complaint leading to consultation is: cervical MR review     Visit type: Virtual visit with audio only (telephone)  Total time spent with patient: 20     The reason for the audio only service rather than synchronous audio and video virtual visit was related to technical difficulties or patient preference/necessity.     Each patient to whom I provide medical services by telemedicine is:  (1) informed of the relationship between the physician and patient and the respective role of any other health care provider with respect to management of the patient; and (2) notified that they may decline to receive medical services by telemedicine and may withdraw from such care at any time. Patient verbally consented to receive this service via voice-only telephone call.       HPI:  Patient on audio visit today discuss cervical spine MRI was done to discuss his cervical spine MRI results     His symptoms from his previous visit are essentially unchanged   progressively worse neck pain going into the shoulder behind his arm into his scapular area.  Radiating into his hand.  He has numbness and tingling into the thumb only.  Does not report any numbness and tingling into the 2nd and 3rd digit.  Pain and scapular symptoms ranging anywhere between 2 and 5 out of severity  Denies any weakness    MR cervical spine results   Multilevel degenerative changes of the cervical spine contribute to mild C4-C5 through C6-C7 spinal canal stenosis.     Multilevel neural foraminal stenosis is most pronounced at C5-C6 with moderate to severe bilateral neural foraminal stenosis      Assessment and plan:  Patient with   Cervical spine pain and symptoms consistent with his degenerative changes most significant at the C5-6 level  Would like to try outpatient physical therapy 1st with cervical traction.  In addition to this refer to pain management for possible injections for pain  relief  I have prescribed an anti-inflammatory to take as needed for pain today     Follow up in 6 weeks to discuss findings as well as any future treatments warranted     Thank you for the referral   Please call with any questions    Donaldo Orlando MD  Neurosurgery     Disclaimer: This note was prepared using a voice recognition system and is likely to have sound alike errors within the text.                       This service was not originating from a related E/M service provided within the previous 7 days nor will  to an E/M service or procedure within the next 24 hours or my soonest available appointment.  Prevailing standard of care was able to be met in this audio-only visit.

## 2023-10-03 NOTE — TELEPHONE ENCOUNTER
Called patient left message to return call to discuss further rescheduling appt and images    Educated and declined Educated and declined/done

## 2023-10-06 ENCOUNTER — OFFICE VISIT (OUTPATIENT)
Dept: PAIN MEDICINE | Facility: CLINIC | Age: 68
End: 2023-10-06
Payer: MEDICARE

## 2023-10-06 VITALS
SYSTOLIC BLOOD PRESSURE: 157 MMHG | HEART RATE: 67 BPM | BODY MASS INDEX: 41.01 KG/M2 | WEIGHT: 302.81 LBS | DIASTOLIC BLOOD PRESSURE: 97 MMHG | HEIGHT: 72 IN | RESPIRATION RATE: 17 BRPM

## 2023-10-06 DIAGNOSIS — M50.30 DEGENERATIVE DISC DISEASE, CERVICAL: ICD-10-CM

## 2023-10-06 DIAGNOSIS — M54.12 CERVICAL RADICULOPATHY: Primary | ICD-10-CM

## 2023-10-06 DIAGNOSIS — M51.36 DDD (DEGENERATIVE DISC DISEASE), LUMBAR: ICD-10-CM

## 2023-10-06 DIAGNOSIS — M47.816 LUMBAR SPONDYLOSIS: ICD-10-CM

## 2023-10-06 DIAGNOSIS — M47.812 CERVICAL SPONDYLOSIS: ICD-10-CM

## 2023-10-06 PROCEDURE — 99204 OFFICE O/P NEW MOD 45 MIN: CPT | Mod: S$PBB,,, | Performed by: ANESTHESIOLOGY

## 2023-10-06 PROCEDURE — 99999 PR PBB SHADOW E&M-EST. PATIENT-LVL IV: ICD-10-PCS | Mod: PBBFAC,,, | Performed by: ANESTHESIOLOGY

## 2023-10-06 PROCEDURE — 99204 PR OFFICE/OUTPT VISIT, NEW, LEVL IV, 45-59 MIN: ICD-10-PCS | Mod: S$PBB,,, | Performed by: ANESTHESIOLOGY

## 2023-10-06 PROCEDURE — 99999 PR PBB SHADOW E&M-EST. PATIENT-LVL IV: CPT | Mod: PBBFAC,,, | Performed by: ANESTHESIOLOGY

## 2023-10-06 PROCEDURE — 99214 OFFICE O/P EST MOD 30 MIN: CPT | Mod: PBBFAC | Performed by: ANESTHESIOLOGY

## 2023-10-06 RX ORDER — PREGABALIN 50 MG/1
CAPSULE ORAL
Qty: 54 CAPSULE | Refills: 0 | Status: SHIPPED | OUTPATIENT
Start: 2023-10-06 | End: 2023-11-04
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
## 2023-10-09 NOTE — PRE-PROCEDURE INSTRUCTIONS
Spoke with patient regarding procedure scheduled on 10.18     Arrival time 0830     Has patient been sick with fever or on antibiotics within the last 7 days? No     Does the patient have any open wounds, sores or rashes? No     Does the patient have any recent fractures? no     Has patient received a vaccination within the last 7 days? No     Received the COVID vaccination? yes     Has the patient stopped all medications as directed? na     Does patient have a pacemaker, defibrillator, or implantable stimulator? No     Does the patient have a ride to and from procedure and someone reliable to remain with patient? wife     Is the patient diabetic? no     Does the patient have sleep apnea? Or use O2 at home? brandon on cpap     Is the patient receiving sedation? yes     Is the patient instructed to remain NPO beginning at midnight the night before their procedure? yes     Procedure location confirmed with patient? Yes     Covid- Denies signs/symptoms. Instructed to notify PAT/MD if any changes.

## 2023-10-18 ENCOUNTER — HOSPITAL ENCOUNTER (OUTPATIENT)
Facility: HOSPITAL | Age: 68
Discharge: HOME | End: 2023-10-18
Attending: ANESTHESIOLOGY | Admitting: ANESTHESIOLOGY
Payer: MEDICARE

## 2023-10-18 VITALS
TEMPERATURE: 97 F | HEART RATE: 57 BPM | DIASTOLIC BLOOD PRESSURE: 77 MMHG | BODY MASS INDEX: 40.37 KG/M2 | WEIGHT: 298.06 LBS | OXYGEN SATURATION: 96 % | HEIGHT: 72 IN | SYSTOLIC BLOOD PRESSURE: 127 MMHG | RESPIRATION RATE: 16 BRPM

## 2023-10-18 DIAGNOSIS — M54.12 CERVICAL RADICULOPATHY: ICD-10-CM

## 2023-10-18 PROCEDURE — 25500020 PHARM REV CODE 255: Performed by: ANESTHESIOLOGY

## 2023-10-18 PROCEDURE — 63600175 PHARM REV CODE 636 W HCPCS: Performed by: ANESTHESIOLOGY

## 2023-10-18 PROCEDURE — 62321 NJX INTERLAMINAR CRV/THRC: CPT | Mod: ,,, | Performed by: ANESTHESIOLOGY

## 2023-10-18 PROCEDURE — 62321 PR INJ CERV/THORAC, W/GUIDANCE: ICD-10-PCS | Mod: ,,, | Performed by: ANESTHESIOLOGY

## 2023-10-18 PROCEDURE — 25000003 PHARM REV CODE 250: Performed by: ANESTHESIOLOGY

## 2023-10-18 PROCEDURE — 62321 NJX INTERLAMINAR CRV/THRC: CPT | Performed by: ANESTHESIOLOGY

## 2023-10-18 RX ORDER — FENTANYL CITRATE 50 UG/ML
INJECTION, SOLUTION INTRAMUSCULAR; INTRAVENOUS
Status: DISCONTINUED | OUTPATIENT
Start: 2023-10-18 | End: 2023-10-18 | Stop reason: HOSPADM

## 2023-10-18 RX ORDER — DEXAMETHASONE SODIUM PHOSPHATE 10 MG/ML
INJECTION INTRAMUSCULAR; INTRAVENOUS
Status: DISCONTINUED | OUTPATIENT
Start: 2023-10-18 | End: 2023-10-18 | Stop reason: HOSPADM

## 2023-10-18 RX ORDER — LIDOCAINE HYDROCHLORIDE 10 MG/ML
INJECTION, SOLUTION EPIDURAL; INFILTRATION; INTRACAUDAL; PERINEURAL
Status: DISCONTINUED | OUTPATIENT
Start: 2023-10-18 | End: 2023-10-18 | Stop reason: HOSPADM

## 2023-10-18 RX ORDER — MIDAZOLAM HYDROCHLORIDE 1 MG/ML
INJECTION, SOLUTION INTRAMUSCULAR; INTRAVENOUS
Status: DISCONTINUED | OUTPATIENT
Start: 2023-10-18 | End: 2023-10-18 | Stop reason: HOSPADM

## 2023-10-18 NOTE — DISCHARGE INSTRUCTIONS

## 2023-10-18 NOTE — DISCHARGE SUMMARY
Discharge Note  Short Stay      SUMMARY     Admit Date: 10/18/2023    Attending Physician: Ash Slaughter MD        Discharge Physician: Ash Slaughter MD        Discharge Date: 10/18/2023 10:03 AM    Procedure(s) (LRB):  C6/7 IL KENNEDY, R paramedian approach (N/A)    Final Diagnosis: Cervical radiculopathy [M54.12]    Disposition: Home or self care    Patient Instructions:   Current Discharge Medication List        CONTINUE these medications which have NOT CHANGED    Details   amLODIPine (NORVASC) 10 MG tablet Take 10 mg by mouth every evening.       lisinopriL (PRINIVIL,ZESTRIL) 40 MG tablet Take 40 mg by mouth once daily.       atorvastatin (LIPITOR) 20 MG tablet Take 20 mg by mouth every evening.       BYSTOLIC 20 mg Tab Take 1 tablet by mouth once daily.      DULoxetine (CYMBALTA) 30 MG capsule Take 30 mg by mouth every evening.       hydrALAZINE (APRESOLINE) 10 MG tablet Take 10 mg by mouth 2 (two) times daily.    Comments: .      pantoprazole (PROTONIX) 40 MG tablet Take 40 mg by mouth once daily.       pregabalin (LYRICA) 50 MG capsule Take 1 capsule (50 mg total) by mouth every evening for 5 days, THEN 1 capsule (50 mg total) 2 (two) times daily for 25 days.  Qty: 54 capsule, Refills: 0    Associated Diagnoses: Degenerative disc disease, cervical; Cervical radiculopathy                 Discharge Diagnosis: Cervical radiculopathy [M54.12]  Condition on Discharge: Stable with no complications to procedure   Diet on Discharge: Same as before.  Activity: as per instruction sheet.  Discharge to: Home with a responsible adult.  Follow up: 2-4 weeks       Please call the office at [phone] if you experience any weakness or loss of sensation, fever > 101.5, pain uncontrolled with oral medications, persistent nausea/vomiting/or diarrhea, redness or drainage from the incisions, or any other worrisome concerns. If physician on call was not reached or could not communicate with our office for any reason please  go to the nearest emergency department

## 2023-10-18 NOTE — OP NOTE
Cervical Interlaminar Epidural Steroid Injection under Fluoroscopic Guidance.     INFORMED CONSENT: The procedure, risks, benefits and options were discussed with patient. There are no contraindications to the procedure. The patient expressed understanding and agreed to proceed. The personnel performing the procedure was discussed.    Date of procedure 10/18/2023    Time-out taken to identify patient and procedure side prior to starting the procedure.                     PROCEDURE: Cervical Interlaminar epidural steroid injection C6/C7 under fluoroscopic guidance.     Pre Procedure diagnosis:    Cervical radiculopathy [M54.12]  1. Cervical radiculopathy        Post-Procedure diagnosis:   same      PHYSICIAN: Ash Slaughter MD    ASSISTANTS: None     MEDICATIONS INJECTED: 2ml Betamethasone PF 6mg/ml and 1ml  Lidocaine 1%    LOCAL ANESTHETIC INJECTED: 3ml  Lidocaine 1%     ESTIMATED BLOOD LOSS: none.     COMPLICATIONS: none.     Sedation: Conscious sedation provided by M.D    SEDATION MEDICATIONS: local/IV sedation: Versed 2 mg and fentanyl 75 mcg IV.  Conscious sedation ordered by MD.  Patient reevaluated and sedation administered by MD and monitored by RN.  Total sedation time was less than 15 min.      Total sedation time was >10 but <20 min      TECHNIQUE: With the patient laying in a prone position, the area was prepped and draped in the usual sterile fashion using ChloraPrep and a fenestrated drape. Local anesthetic was given using a 27-gauge needle by raising a wheal and going down to the hub of the needle over the C6/C7 interlaminar space.  The interlaminar space was then approached with a 3.5 inch 18-gauge Touhy needle was introduced under fluoroscopic guidance in the AP and Lateral view. Once the Ligamentum flavum was encountered loss of resistance to saline was used to enter the epidural space. With positive loss of resistance and negative CSF or Blood, 2mL contrast dye Omnipaque (300mg/ml) was injected  to confirm placement and there was no vascular runoff. The medication was then injected slowly. The patient tolerated the procedure well.         The patient was monitored for approximately 30 minutes after the procedure.  Patient was given post procedure and discharge instructions to follow at home.  The patient was discharged in a stable condition

## (undated) DEVICE — SEE MEDLINE ITEM 157027

## (undated) DEVICE — DRESSING ANTIMICROBIAL 1 INCH

## (undated) DEVICE — SUT VICRYL PLUS 0 CT1 18IN

## (undated) DEVICE — ADHESIVE MASTISOL VIAL 48/BX

## (undated) DEVICE — SPHERE NDI PASSIVE

## (undated) DEVICE — PROBE BALL TIP 2.3MM

## (undated) DEVICE — SKIN MARKER DEVON 160

## (undated) DEVICE — MATRIX FLOSEAL HEMOSTATIC 10ML

## (undated) DEVICE — DRAPE MOBILE C-ARM

## (undated) DEVICE — NDL HYPODERMIC BLUNT 18G 1.5IN

## (undated) DEVICE — INSERT CUSHIONPRONE VIEW LARGE

## (undated) DEVICE — BLADE BONE BIOPSY FINE 3.2MM

## (undated) DEVICE — SEE MEDLINE ITEM 152739

## (undated) DEVICE — SUT VICRYL PLUS 0 CT-1 18IN

## (undated) DEVICE — DRESSING AQUACEL AG 3.5X10IN

## (undated) DEVICE — SHEET XLGE DRAPE

## (undated) DEVICE — GLOVE BIOGEL ECLIPSE SZ 8

## (undated) DEVICE — DRAPE C-ARMOR EQUIPMENT COVER

## (undated) DEVICE — CONTAINER SPECIMEN STRL 4OZ

## (undated) DEVICE — Device

## (undated) DEVICE — UNDERGLOVES BIOGEL PI SIZE 8

## (undated) DEVICE — DRAPE STERI INSTRUMENT 1018

## (undated) DEVICE — GAUZE SPONGE 4X4 12PLY

## (undated) DEVICE — SUT VICRYL+ 2-0 SH 18IN

## (undated) DEVICE — SEE MEDLINE ITEM 157148

## (undated) DEVICE — NDL ECLIPSE SAFETY 18GX1-1/2IN

## (undated) DEVICE — SEALER AQUAMANTYS 3.48MM

## (undated) DEVICE — ALCOHOL 70% ISOP RUBBING 4OZ

## (undated) DEVICE — SYR 10CC LUER LOCK

## (undated) DEVICE — COVER OVERHEAD SURG LT BLUE

## (undated) DEVICE — DRESSING TRANS 4X4 TEGADERM

## (undated) DEVICE — DRESSING WOUND 5.5X12CM WHITE

## (undated) DEVICE — LOTION DURA PREP REMOVER 40Z

## (undated) DEVICE — DURAPREP SURG SCRUB 26ML

## (undated) DEVICE — KIT EVACUATOR 3-SPRING 1/8 DRN

## (undated) DEVICE — ELECTRODE REM PLYHSV RETURN 9

## (undated) DEVICE — WAX BONE STERILE 2.5G

## (undated) DEVICE — TAPE SILK 3IN

## (undated) DEVICE — SEE MEDLINE ITEM 157131

## (undated) DEVICE — DRAPE OPMI STERILE

## (undated) DEVICE — SHEET DRAPE FAN-FOLDED 3/4

## (undated) DEVICE — SEE MEDLINE ITEM 146292

## (undated) DEVICE — STAPLER SKIN PROXIMATE WIDE

## (undated) DEVICE — SEALER AQUAMANTYS 2.3 BIPOLAR

## (undated) DEVICE — SPONGE PATTY SURGICAL .5X3IN

## (undated) DEVICE — NDL SAFETY 25G X 1.5 ECLIPSE

## (undated) DEVICE — SYR ONLY LUER LOCK 20CC

## (undated) DEVICE — DRAPE INCISE IOBAN 2 23X17IN

## (undated) DEVICE — UNDERGLOVES BIOGEL PI SIZE 8.5

## (undated) DEVICE — WARMER DRAPE STERILE LF

## (undated) DEVICE — GLOVE SURGICAL LATEX SZ 7

## (undated) DEVICE — UNDERGLOVE BIOGEL PI SZ 6.5 LF

## (undated) DEVICE — DRESSING ADH SQUARE 8X8CM

## (undated) DEVICE — DRAPE SURG W/TWL 17 5/8X23

## (undated) DEVICE — NDL SPINAL 20GX3.5 HUB

## (undated) DEVICE — NDL SAFETY 22G X 1.5 ECLIPSE

## (undated) DEVICE — SOL NACL 0.9% INJ 250ML BG

## (undated) DEVICE — BLADE EZ CLEAN 2.5IN MODIFIED

## (undated) DEVICE — CORD BIPOLAR ELECTROSURGICAL

## (undated) DEVICE — MANIFOLD 4 PORT

## (undated) DEVICE — ELECTRODE BLD EXT 6.50 ST MDFD

## (undated) DEVICE — SEE MEDLINE ITEM 152622

## (undated) DEVICE — DRESSING SURGICAL 1/2X1/2

## (undated) DEVICE — BUR LEGEND MIDAS REX 14CM 13MM

## (undated) DEVICE — GLOVE SURGICAL LATEX SZ 8

## (undated) DEVICE — SEE MEDLINE ITEM 157117

## (undated) DEVICE — SEE MEDLINE ITEM 152506

## (undated) DEVICE — SPONGE GAUZE 16PLY 4X4

## (undated) DEVICE — UNDERGLOVES BIOGEL PI SZ 7 LF